# Patient Record
Sex: MALE | Race: BLACK OR AFRICAN AMERICAN | Employment: OTHER | URBAN - NONMETROPOLITAN AREA
[De-identification: names, ages, dates, MRNs, and addresses within clinical notes are randomized per-mention and may not be internally consistent; named-entity substitution may affect disease eponyms.]

---

## 2017-02-06 DIAGNOSIS — M10.9 ACUTE GOUT, UNSPECIFIED CAUSE, UNSPECIFIED SITE: ICD-10-CM

## 2017-02-06 DIAGNOSIS — I42.9 CARDIOMYOPATHY (HCC): ICD-10-CM

## 2017-02-06 DIAGNOSIS — I10 ESSENTIAL HYPERTENSION: ICD-10-CM

## 2017-02-06 RX ORDER — POTASSIUM CHLORIDE 20 MEQ/1
TABLET, EXTENDED RELEASE ORAL
Qty: 28 TABLET | Refills: 5 | Status: SHIPPED | OUTPATIENT
Start: 2017-02-06 | End: 2017-07-24 | Stop reason: SDUPTHER

## 2017-02-06 RX ORDER — ALLOPURINOL 100 MG/1
TABLET ORAL
Qty: 28 TABLET | Refills: 5 | Status: SHIPPED | OUTPATIENT
Start: 2017-02-06 | End: 2017-07-24 | Stop reason: SDUPTHER

## 2017-02-06 RX ORDER — CARVEDILOL 25 MG/1
TABLET ORAL
Qty: 56 TABLET | Refills: 5 | Status: SHIPPED | OUTPATIENT
Start: 2017-02-06 | End: 2017-07-24 | Stop reason: SDUPTHER

## 2017-02-24 ENCOUNTER — OFFICE VISIT (OUTPATIENT)
Dept: INTERNAL MEDICINE | Age: 52
End: 2017-02-24

## 2017-02-24 VITALS
HEART RATE: 78 BPM | OXYGEN SATURATION: 97 % | BODY MASS INDEX: 37.27 KG/M2 | HEIGHT: 74 IN | SYSTOLIC BLOOD PRESSURE: 138 MMHG | WEIGHT: 290.4 LBS | DIASTOLIC BLOOD PRESSURE: 90 MMHG

## 2017-02-24 DIAGNOSIS — Z72.0 TOBACCO ABUSE: ICD-10-CM

## 2017-02-24 DIAGNOSIS — J44.1 CHRONIC OBSTRUCTIVE PULMONARY DISEASE WITH ACUTE EXACERBATION (HCC): ICD-10-CM

## 2017-02-24 DIAGNOSIS — I42.9 CARDIOMYOPATHY (HCC): Primary | ICD-10-CM

## 2017-02-24 DIAGNOSIS — I10 ESSENTIAL HYPERTENSION: ICD-10-CM

## 2017-02-24 DIAGNOSIS — Z87.39 HISTORY OF GOUT: ICD-10-CM

## 2017-02-24 DIAGNOSIS — F20.0 PARANOID SCHIZOPHRENIA (HCC): ICD-10-CM

## 2017-02-24 DIAGNOSIS — G47.33 OBSTRUCTIVE SLEEP APNEA: ICD-10-CM

## 2017-02-24 PROCEDURE — 93000 ELECTROCARDIOGRAM COMPLETE: CPT | Performed by: INTERNAL MEDICINE

## 2017-02-24 PROCEDURE — 99214 OFFICE O/P EST MOD 30 MIN: CPT | Performed by: INTERNAL MEDICINE

## 2017-02-24 RX ORDER — MELOXICAM 7.5 MG/1
TABLET ORAL
Qty: 28 TABLET | Refills: 5 | Status: SHIPPED | OUTPATIENT
Start: 2017-02-24 | End: 2017-08-11 | Stop reason: SDUPTHER

## 2017-02-24 RX ORDER — ALBUTEROL SULFATE 90 UG/1
2 AEROSOL, METERED RESPIRATORY (INHALATION) EVERY 6 HOURS PRN
Qty: 1 INHALER | Refills: 5 | Status: SHIPPED | OUTPATIENT
Start: 2017-02-24 | End: 2017-08-11 | Stop reason: SDUPTHER

## 2017-02-24 RX ORDER — HYDROCHLOROTHIAZIDE 25 MG/1
25 TABLET ORAL DAILY
Qty: 30 TABLET | Refills: 5 | Status: SHIPPED | OUTPATIENT
Start: 2017-02-24 | End: 2017-08-11 | Stop reason: SDUPTHER

## 2017-03-03 DIAGNOSIS — K59.00 CONSTIPATION, UNSPECIFIED CONSTIPATION TYPE: ICD-10-CM

## 2017-03-06 RX ORDER — DOCUSATE SODIUM 100 MG/1
CAPSULE ORAL
Qty: 30 CAPSULE | Refills: 5 | Status: SHIPPED | OUTPATIENT
Start: 2017-03-06 | End: 2017-08-18 | Stop reason: SDUPTHER

## 2017-07-24 DIAGNOSIS — M10.9 ACUTE GOUT, UNSPECIFIED CAUSE, UNSPECIFIED SITE: ICD-10-CM

## 2017-07-24 DIAGNOSIS — I42.9 CARDIOMYOPATHY (HCC): ICD-10-CM

## 2017-07-24 DIAGNOSIS — I10 ESSENTIAL HYPERTENSION: ICD-10-CM

## 2017-07-25 RX ORDER — POTASSIUM CHLORIDE 20 MEQ/1
TABLET, EXTENDED RELEASE ORAL
Qty: 28 TABLET | Refills: 5 | Status: SHIPPED | OUTPATIENT
Start: 2017-07-25 | End: 2018-01-09 | Stop reason: SDUPTHER

## 2017-07-25 RX ORDER — ALLOPURINOL 100 MG/1
TABLET ORAL
Qty: 28 TABLET | Refills: 5 | Status: SHIPPED | OUTPATIENT
Start: 2017-07-25 | End: 2018-01-09 | Stop reason: SDUPTHER

## 2017-07-25 RX ORDER — CARVEDILOL 25 MG/1
TABLET ORAL
Qty: 56 TABLET | Refills: 5 | Status: SHIPPED | OUTPATIENT
Start: 2017-07-25 | End: 2018-01-09 | Stop reason: SDUPTHER

## 2017-08-11 ENCOUNTER — OFFICE VISIT (OUTPATIENT)
Dept: INTERNAL MEDICINE CLINIC | Age: 52
End: 2017-08-11
Payer: COMMERCIAL

## 2017-08-11 VITALS
HEIGHT: 74 IN | BODY MASS INDEX: 37.4 KG/M2 | OXYGEN SATURATION: 98 % | DIASTOLIC BLOOD PRESSURE: 88 MMHG | SYSTOLIC BLOOD PRESSURE: 126 MMHG | HEART RATE: 76 BPM | WEIGHT: 291.4 LBS

## 2017-08-11 DIAGNOSIS — Z87.39 H/O: GOUT: ICD-10-CM

## 2017-08-11 DIAGNOSIS — E66.9 OBESITY (BMI 30-39.9): ICD-10-CM

## 2017-08-11 DIAGNOSIS — I10 ESSENTIAL HYPERTENSION: ICD-10-CM

## 2017-08-11 DIAGNOSIS — Z87.39 HISTORY OF GOUT: ICD-10-CM

## 2017-08-11 DIAGNOSIS — I25.10 CORONARY ARTERY DISEASE INVOLVING NATIVE CORONARY ARTERY OF NATIVE HEART WITHOUT ANGINA PECTORIS: ICD-10-CM

## 2017-08-11 DIAGNOSIS — F17.200 TOBACCO USE DISORDER: ICD-10-CM

## 2017-08-11 DIAGNOSIS — M10.9 ACUTE GOUT, UNSPECIFIED CAUSE, UNSPECIFIED SITE: ICD-10-CM

## 2017-08-11 DIAGNOSIS — J44.1 CHRONIC OBSTRUCTIVE PULMONARY DISEASE WITH ACUTE EXACERBATION (HCC): Primary | ICD-10-CM

## 2017-08-11 DIAGNOSIS — F20.0 PARANOID SCHIZOPHRENIA (HCC): ICD-10-CM

## 2017-08-11 PROCEDURE — 99214 OFFICE O/P EST MOD 30 MIN: CPT | Performed by: INTERNAL MEDICINE

## 2017-08-11 PROCEDURE — 93000 ELECTROCARDIOGRAM COMPLETE: CPT | Performed by: INTERNAL MEDICINE

## 2017-08-11 RX ORDER — HYDROCHLOROTHIAZIDE 25 MG/1
25 TABLET ORAL DAILY
Qty: 30 TABLET | Refills: 5 | Status: SHIPPED | OUTPATIENT
Start: 2017-08-11 | End: 2018-01-17 | Stop reason: SDUPTHER

## 2017-08-11 RX ORDER — ALBUTEROL SULFATE 90 UG/1
2 AEROSOL, METERED RESPIRATORY (INHALATION) EVERY 6 HOURS PRN
Qty: 1 INHALER | Refills: 5 | Status: SHIPPED | OUTPATIENT
Start: 2017-08-11 | End: 2018-01-17 | Stop reason: SDUPTHER

## 2017-08-11 RX ORDER — MELOXICAM 7.5 MG/1
TABLET ORAL
Qty: 28 TABLET | Refills: 5 | Status: SHIPPED | OUTPATIENT
Start: 2017-08-11 | End: 2018-01-17 | Stop reason: SDUPTHER

## 2017-08-11 ASSESSMENT — PATIENT HEALTH QUESTIONNAIRE - PHQ9
2. FEELING DOWN, DEPRESSED OR HOPELESS: 0
1. LITTLE INTEREST OR PLEASURE IN DOING THINGS: 0
SUM OF ALL RESPONSES TO PHQ QUESTIONS 1-9: 0
SUM OF ALL RESPONSES TO PHQ9 QUESTIONS 1 & 2: 0

## 2017-08-15 ENCOUNTER — HOSPITAL ENCOUNTER (OUTPATIENT)
Dept: PULMONOLOGY | Age: 52
Discharge: HOME | End: 2017-08-15
Payer: COMMERCIAL

## 2017-08-15 DIAGNOSIS — J44.1 CHRONIC OBSTRUCTIVE PULMONARY DISEASE WITH ACUTE EXACERBATION (HCC): ICD-10-CM

## 2017-08-15 PROCEDURE — 94729 DIFFUSING CAPACITY: CPT

## 2017-08-15 PROCEDURE — 94060 EVALUATION OF WHEEZING: CPT

## 2017-08-15 PROCEDURE — 94726 PLETHYSMOGRAPHY LUNG VOLUMES: CPT

## 2017-08-17 DIAGNOSIS — Z87.39 H/O: GOUT: ICD-10-CM

## 2017-08-18 DIAGNOSIS — K59.00 CONSTIPATION, UNSPECIFIED CONSTIPATION TYPE: ICD-10-CM

## 2017-08-21 RX ORDER — DOCUSATE SODIUM 100 MG/1
CAPSULE ORAL
Qty: 30 CAPSULE | Refills: 5 | Status: SHIPPED | OUTPATIENT
Start: 2017-08-21 | End: 2018-01-17 | Stop reason: SDUPTHER

## 2017-09-13 ENCOUNTER — TELEPHONE (OUTPATIENT)
Dept: INTERNAL MEDICINE CLINIC | Age: 52
End: 2017-09-13

## 2017-09-15 RX ORDER — VARENICLINE TARTRATE 25 MG
KIT ORAL
Qty: 53 EACH | Refills: 0 | Status: SHIPPED | OUTPATIENT
Start: 2017-09-15 | End: 2017-11-02 | Stop reason: SDUPTHER

## 2017-09-24 ENCOUNTER — APPOINTMENT (OUTPATIENT)
Dept: GENERAL RADIOLOGY | Age: 52
End: 2017-09-24
Payer: COMMERCIAL

## 2017-09-24 ENCOUNTER — HOSPITAL ENCOUNTER (EMERGENCY)
Age: 52
Discharge: HOME | End: 2017-09-24
Payer: COMMERCIAL

## 2017-09-24 ENCOUNTER — APPOINTMENT (OUTPATIENT)
Dept: CT IMAGING | Age: 52
End: 2017-09-24
Payer: COMMERCIAL

## 2017-09-24 VITALS
OXYGEN SATURATION: 98 % | SYSTOLIC BLOOD PRESSURE: 163 MMHG | WEIGHT: 293 LBS | BODY MASS INDEX: 37.6 KG/M2 | HEART RATE: 79 BPM | HEIGHT: 74 IN | TEMPERATURE: 98.5 F | RESPIRATION RATE: 18 BRPM | DIASTOLIC BLOOD PRESSURE: 96 MMHG

## 2017-09-24 DIAGNOSIS — S39.012A LOW BACK STRAIN, INITIAL ENCOUNTER: Primary | ICD-10-CM

## 2017-09-24 LAB
BACTERIA: ABNORMAL /HPF
BILIRUBIN URINE: NEGATIVE
BLOOD, URINE: ABNORMAL
CASTS 2: ABNORMAL /LPF
CASTS UA: ABNORMAL /LPF
CHARACTER, URINE: CLEAR
COLOR: ABNORMAL
CRYSTALS, UA: ABNORMAL
EPITHELIAL CELLS, UA: ABNORMAL /HPF
GLUCOSE URINE: NEGATIVE MG/DL
KETONES, URINE: ABNORMAL
LEUKOCYTE ESTERASE, URINE: NEGATIVE
MISCELLANEOUS 2: ABNORMAL
NITRITE, URINE: NEGATIVE
PH UA: 5.5
PROTEIN UA: NEGATIVE
RBC URINE: ABNORMAL /HPF
RENAL EPITHELIAL, UA: ABNORMAL
SPECIFIC GRAVITY, URINE: 1.03 (ref 1–1.03)
UROBILINOGEN, URINE: 1 EU/DL
WBC UA: ABNORMAL /HPF
YEAST: ABNORMAL

## 2017-09-24 PROCEDURE — 74150 CT ABDOMEN W/O CONTRAST: CPT

## 2017-09-24 PROCEDURE — 96372 THER/PROPH/DIAG INJ SC/IM: CPT

## 2017-09-24 PROCEDURE — 76376 3D RENDER W/INTRP POSTPROCES: CPT

## 2017-09-24 PROCEDURE — 99284 EMERGENCY DEPT VISIT MOD MDM: CPT

## 2017-09-24 PROCEDURE — 6370000000 HC RX 637 (ALT 250 FOR IP): Performed by: PHYSICIAN ASSISTANT

## 2017-09-24 PROCEDURE — 72072 X-RAY EXAM THORAC SPINE 3VWS: CPT

## 2017-09-24 PROCEDURE — 81001 URINALYSIS AUTO W/SCOPE: CPT

## 2017-09-24 PROCEDURE — 72100 X-RAY EXAM L-S SPINE 2/3 VWS: CPT

## 2017-09-24 PROCEDURE — 6360000002 HC RX W HCPCS: Performed by: PHYSICIAN ASSISTANT

## 2017-09-24 RX ORDER — LIDOCAINE 50 MG/G
1 PATCH TOPICAL ONCE
Status: DISCONTINUED | OUTPATIENT
Start: 2017-09-24 | End: 2017-09-24 | Stop reason: HOSPADM

## 2017-09-24 RX ORDER — ORPHENADRINE CITRATE 30 MG/ML
60 INJECTION INTRAMUSCULAR; INTRAVENOUS ONCE
Status: COMPLETED | OUTPATIENT
Start: 2017-09-24 | End: 2017-09-24

## 2017-09-24 RX ORDER — LIDOCAINE 50 MG/G
1 PATCH TOPICAL DAILY
Qty: 15 PATCH | Refills: 0 | Status: SHIPPED | OUTPATIENT
Start: 2017-09-24 | End: 2018-01-17 | Stop reason: ALTCHOICE

## 2017-09-24 RX ORDER — KETOROLAC TROMETHAMINE 30 MG/ML
60 INJECTION, SOLUTION INTRAMUSCULAR; INTRAVENOUS ONCE
Status: COMPLETED | OUTPATIENT
Start: 2017-09-24 | End: 2017-09-24

## 2017-09-24 RX ORDER — CYCLOBENZAPRINE HCL 10 MG
10 TABLET ORAL 3 TIMES DAILY PRN
Qty: 15 TABLET | Refills: 0 | Status: SHIPPED | OUTPATIENT
Start: 2017-09-24 | End: 2017-10-04

## 2017-09-24 RX ADMIN — KETOROLAC TROMETHAMINE 60 MG: 30 INJECTION, SOLUTION INTRAMUSCULAR at 11:45

## 2017-09-24 RX ADMIN — ORPHENADRINE CITRATE 60 MG: 30 INJECTION INTRAMUSCULAR; INTRAVENOUS at 11:44

## 2017-09-24 ASSESSMENT — ENCOUNTER SYMPTOMS
EYE DISCHARGE: 0
SORE THROAT: 0
VOMITING: 0
SHORTNESS OF BREATH: 0
COUGH: 0
RHINORRHEA: 0
EYE REDNESS: 0
DIARRHEA: 0
NAUSEA: 0
WHEEZING: 0
ABDOMINAL PAIN: 0
BACK PAIN: 1

## 2017-09-24 ASSESSMENT — PAIN DESCRIPTION - DESCRIPTORS: DESCRIPTORS: CONSTANT

## 2017-09-24 ASSESSMENT — PAIN SCALES - GENERAL
PAINLEVEL_OUTOF10: 8
PAINLEVEL_OUTOF10: 6
PAINLEVEL_OUTOF10: 8

## 2017-09-24 ASSESSMENT — PAIN DESCRIPTION - ORIENTATION: ORIENTATION: LEFT;LOWER

## 2017-09-24 ASSESSMENT — PAIN DESCRIPTION - PAIN TYPE: TYPE: ACUTE PAIN

## 2017-09-24 ASSESSMENT — PAIN DESCRIPTION - LOCATION: LOCATION: BACK

## 2017-11-02 RX ORDER — VARENICLINE TARTRATE 25 MG
KIT ORAL
Qty: 53 EACH | Refills: 0 | Status: SHIPPED | OUTPATIENT
Start: 2017-11-02 | End: 2018-08-17 | Stop reason: SDUPTHER

## 2018-01-09 DIAGNOSIS — I10 ESSENTIAL HYPERTENSION: ICD-10-CM

## 2018-01-09 DIAGNOSIS — M10.9 ACUTE GOUT, UNSPECIFIED CAUSE, UNSPECIFIED SITE: ICD-10-CM

## 2018-01-09 DIAGNOSIS — I42.9 CARDIOMYOPATHY (HCC): ICD-10-CM

## 2018-01-10 RX ORDER — CARVEDILOL 25 MG/1
TABLET ORAL
Qty: 56 TABLET | Refills: 5 | Status: SHIPPED | OUTPATIENT
Start: 2018-01-10 | End: 2018-06-25 | Stop reason: SDUPTHER

## 2018-01-10 RX ORDER — ALLOPURINOL 100 MG/1
TABLET ORAL
Qty: 28 TABLET | Refills: 5 | Status: SHIPPED | OUTPATIENT
Start: 2018-01-10 | End: 2018-06-25 | Stop reason: SDUPTHER

## 2018-01-10 RX ORDER — POTASSIUM CHLORIDE 20 MEQ/1
TABLET, EXTENDED RELEASE ORAL
Qty: 28 TABLET | Refills: 5 | Status: SHIPPED | OUTPATIENT
Start: 2018-01-10 | End: 2018-06-25 | Stop reason: SDUPTHER

## 2018-01-17 ENCOUNTER — OFFICE VISIT (OUTPATIENT)
Dept: INTERNAL MEDICINE CLINIC | Age: 53
End: 2018-01-17
Payer: COMMERCIAL

## 2018-01-17 VITALS
WEIGHT: 298 LBS | HEART RATE: 72 BPM | DIASTOLIC BLOOD PRESSURE: 94 MMHG | SYSTOLIC BLOOD PRESSURE: 162 MMHG | HEIGHT: 74 IN | BODY MASS INDEX: 38.24 KG/M2

## 2018-01-17 DIAGNOSIS — G47.33 OBSTRUCTIVE SLEEP APNEA: ICD-10-CM

## 2018-01-17 DIAGNOSIS — I10 ESSENTIAL HYPERTENSION: ICD-10-CM

## 2018-01-17 DIAGNOSIS — J44.9 CHRONIC OBSTRUCTIVE PULMONARY DISEASE, UNSPECIFIED COPD TYPE (HCC): ICD-10-CM

## 2018-01-17 DIAGNOSIS — F20.0 PARANOID SCHIZOPHRENIA (HCC): ICD-10-CM

## 2018-01-17 DIAGNOSIS — Z87.39 HISTORY OF GOUT: ICD-10-CM

## 2018-01-17 DIAGNOSIS — E66.9 OBESITY (BMI 30-39.9): ICD-10-CM

## 2018-01-17 DIAGNOSIS — K59.00 CONSTIPATION, UNSPECIFIED CONSTIPATION TYPE: ICD-10-CM

## 2018-01-17 DIAGNOSIS — I42.9 CARDIOMYOPATHY, UNSPECIFIED TYPE (HCC): Primary | ICD-10-CM

## 2018-01-17 DIAGNOSIS — Z72.0 TOBACCO ABUSE: ICD-10-CM

## 2018-01-17 PROCEDURE — 3023F SPIROM DOC REV: CPT | Performed by: INTERNAL MEDICINE

## 2018-01-17 PROCEDURE — 3017F COLORECTAL CA SCREEN DOC REV: CPT | Performed by: INTERNAL MEDICINE

## 2018-01-17 PROCEDURE — G8417 CALC BMI ABV UP PARAM F/U: HCPCS | Performed by: INTERNAL MEDICINE

## 2018-01-17 PROCEDURE — 4004F PT TOBACCO SCREEN RCVD TLK: CPT | Performed by: INTERNAL MEDICINE

## 2018-01-17 PROCEDURE — 93000 ELECTROCARDIOGRAM COMPLETE: CPT | Performed by: INTERNAL MEDICINE

## 2018-01-17 PROCEDURE — 99214 OFFICE O/P EST MOD 30 MIN: CPT | Performed by: INTERNAL MEDICINE

## 2018-01-17 PROCEDURE — G8484 FLU IMMUNIZE NO ADMIN: HCPCS | Performed by: INTERNAL MEDICINE

## 2018-01-17 PROCEDURE — G8598 ASA/ANTIPLAT THER USED: HCPCS | Performed by: INTERNAL MEDICINE

## 2018-01-17 PROCEDURE — G8427 DOCREV CUR MEDS BY ELIG CLIN: HCPCS | Performed by: INTERNAL MEDICINE

## 2018-01-17 PROCEDURE — G8926 SPIRO NO PERF OR DOC: HCPCS | Performed by: INTERNAL MEDICINE

## 2018-01-17 RX ORDER — MELOXICAM 7.5 MG/1
TABLET ORAL
Qty: 28 TABLET | Refills: 5 | Status: SHIPPED | OUTPATIENT
Start: 2018-01-17 | End: 2018-07-18 | Stop reason: SDUPTHER

## 2018-01-17 RX ORDER — HYDROCHLOROTHIAZIDE 25 MG/1
25 TABLET ORAL DAILY
Qty: 30 TABLET | Refills: 5 | Status: SHIPPED | OUTPATIENT
Start: 2018-01-17 | End: 2018-07-18 | Stop reason: SDUPTHER

## 2018-01-17 RX ORDER — DOCUSATE SODIUM 100 MG/1
CAPSULE, LIQUID FILLED ORAL
Qty: 30 CAPSULE | Refills: 5 | Status: SHIPPED | OUTPATIENT
Start: 2018-01-17 | End: 2018-07-18 | Stop reason: SDUPTHER

## 2018-01-17 RX ORDER — LOSARTAN POTASSIUM 50 MG/1
100 TABLET ORAL DAILY
COMMUNITY
Start: 2018-01-09 | End: 2020-10-07

## 2018-01-17 RX ORDER — ALBUTEROL SULFATE 90 UG/1
2 AEROSOL, METERED RESPIRATORY (INHALATION) EVERY 6 HOURS PRN
Qty: 1 INHALER | Refills: 5 | Status: SHIPPED | OUTPATIENT
Start: 2018-01-17 | End: 2018-07-18 | Stop reason: SDUPTHER

## 2018-01-17 RX ORDER — VARENICLINE TARTRATE 1 MG/1
1 TABLET, FILM COATED ORAL 2 TIMES DAILY
Qty: 60 TABLET | Refills: 3 | Status: SHIPPED | OUTPATIENT
Start: 2018-01-17 | End: 2018-04-27 | Stop reason: SDUPTHER

## 2018-01-17 NOTE — PROGRESS NOTES
Chief Complaint   Patient presents with    Cardiomyopathy    Hypertension    COPD       Patient presents for medical evaluation. I last saw the patient 6 months ago. Patient is followed on a regular basis by Dr. Charles Burgess  Patient has not had any admissions to the hospital or change in medications since the last visit here. Pertinent past history reviewed and summarized below     Pertinent past history reviewed and summarized below  . He was  hospitalized in the psych fischer at [facility] for 34 days  last year  Apparently he was threatening family members, he denies this  [facility]  Patient has h/o cardiomyopathy, sleep apnea for which he has never been treated   He says he has never had sleep studies(he has canceled polysomnography and overnight pulse oximetry testing)      He says he has been feeling well. No major problems  He has had no ER visits. He has not had any gout attacks. He denies shortness of breath, weight gain, orthopnea  Patient says Dr. Zbigniew Mccollum has been adjusting his blood pressure medications. He said it is been running high. He said he had quit smoking but his father  in October. He stopped his Chantix and started smoking again  Patient Active Problem List   Diagnosis    Hypertension    Depression    Chest pain    Left ventricular dysfunction    Cardiomyopathy ([address]    Schizoaffective disorder (HCC)    Chronic bronchitis ([address]    Obstructive sleep apnea    Obesity (BMI 30-39. 9)    Sleep difficulties    Snoring    Bruxism    Sleep talking    Restless sleeper    Insomnia    Sleep disturbance    Claustrophobia    Irregular heart beat    Heart disease    COPD exacerbation (HCC)    COPD exacerbation (HCC)    Influenza A    CAD (coronary artery disease)    Acute respiratory failure with hypoxia and hypercapnia (HCC)    Tobacco abuse    Influenza with respiratory manifestation other than pneumonia    capsule 5    [DISCONTINUED] albuterol sulfate HFA (PROVENTIL HFA) 108 (90 Base) MCG/ACT inhaler Inhale 2 puffs into the lungs every 6 hours as needed for Wheezing or Shortness of Breath 1 Inhaler 5     No facility-administered encounter medications on file as of 1/17/2018. DIAGNOSES  1. Cardiomyopathy, unspecified type ([facility]     2. Essential hypertension  EKG 12 Lead    hydrochlorothiazide (HYDRODIURIL) 25 MG tablet   3. Constipation, unspecified constipation type  docusate sodium (DOCQLACE) 100 MG capsule   4. History of gout  meloxicam (MOBIC) 7.5 MG tablet   5. Chronic obstructive pulmonary disease, unspecified COPD type ([facility])  fluticasone-salmeterol (ADVAIR) 100-50 MCG/DOSE diskus inhaler    tiotropium (SPIRIVA HANDIHALER) 18 MCG inhalation capsule    albuterol sulfate HFA (PROVENTIL HFA) 108 (90 Base) MCG/ACT inhaler   6. Obesity (BMI 30-39.9)     7. Obstructive sleep apnea     8. Paranoid schizophrenia ([facility]     9. Tobacco abuse  varenicline (CHANTIX CONTINUING MONTH GARETT) 1 MG tablet   EKG shows Sinus  Rhythm   Nonspecific QRS widening     Patient has a diagnosis of COPD, continues to smoke   Discussed smoking and many of the possible adverse side effects including COPD, MI, stroke, lung, esophageal, head and neck and bladder cancer as well as recurrent URIs. Modalities to help quitting were discussed as well, including nicotine replacement, wellbutrin and varenicline. Rewards of quitting discussed as well  He said he would like to try Chantix again. I will prescribe this  Cardiac status stable, patient doing extremely well without any symptoms. I reviewed patient's medications and possible interactions and side effects. I refilled those that were needed.   Reviewed all recent labs, will follow up labs ordered and notify patient of any changes in management  BMI 38.24  Low salt, heart healthy diet, exercise and weight loss recommended  Blood pressure elevated, PCP managing      PFTs last visit showed mild obstructive disease    I will see the patient back in 6 months. Continue medications at current doses.

## 2018-04-27 DIAGNOSIS — Z72.0 TOBACCO ABUSE: ICD-10-CM

## 2018-04-30 RX ORDER — VARENICLINE TARTRATE 1 MG/1
1 TABLET, FILM COATED ORAL 2 TIMES DAILY
Qty: 56 TABLET | Refills: 3 | Status: SHIPPED | OUTPATIENT
Start: 2018-04-30 | End: 2018-07-18

## 2018-06-25 DIAGNOSIS — I10 ESSENTIAL HYPERTENSION: ICD-10-CM

## 2018-06-25 DIAGNOSIS — M10.9 ACUTE GOUT, UNSPECIFIED CAUSE, UNSPECIFIED SITE: ICD-10-CM

## 2018-06-25 DIAGNOSIS — I42.9 CARDIOMYOPATHY (HCC): ICD-10-CM

## 2018-06-25 RX ORDER — ALLOPURINOL 100 MG/1
TABLET ORAL
Qty: 28 TABLET | Refills: 5 | Status: SHIPPED | OUTPATIENT
Start: 2018-06-25 | End: 2018-12-06 | Stop reason: SDUPTHER

## 2018-06-25 RX ORDER — POTASSIUM CHLORIDE 20 MEQ/1
TABLET, EXTENDED RELEASE ORAL
Qty: 28 TABLET | Refills: 5 | Status: SHIPPED | OUTPATIENT
Start: 2018-06-25 | End: 2018-12-06 | Stop reason: SDUPTHER

## 2018-06-25 RX ORDER — CARVEDILOL 25 MG/1
TABLET ORAL
Qty: 56 TABLET | Refills: 5 | Status: SHIPPED | OUTPATIENT
Start: 2018-06-25 | End: 2018-12-06 | Stop reason: SDUPTHER

## 2018-07-18 ENCOUNTER — OFFICE VISIT (OUTPATIENT)
Dept: INTERNAL MEDICINE CLINIC | Age: 53
End: 2018-07-18
Payer: COMMERCIAL

## 2018-07-18 VITALS
SYSTOLIC BLOOD PRESSURE: 136 MMHG | HEART RATE: 74 BPM | WEIGHT: 299 LBS | HEIGHT: 74 IN | DIASTOLIC BLOOD PRESSURE: 70 MMHG | BODY MASS INDEX: 38.37 KG/M2

## 2018-07-18 DIAGNOSIS — K59.00 CONSTIPATION, UNSPECIFIED CONSTIPATION TYPE: ICD-10-CM

## 2018-07-18 DIAGNOSIS — I42.9 CARDIOMYOPATHY, UNSPECIFIED TYPE (HCC): Primary | ICD-10-CM

## 2018-07-18 DIAGNOSIS — E66.9 OBESITY (BMI 30-39.9): ICD-10-CM

## 2018-07-18 DIAGNOSIS — I10 ESSENTIAL HYPERTENSION: ICD-10-CM

## 2018-07-18 DIAGNOSIS — J44.9 CHRONIC OBSTRUCTIVE PULMONARY DISEASE, UNSPECIFIED COPD TYPE (HCC): ICD-10-CM

## 2018-07-18 DIAGNOSIS — F25.9 SCHIZOAFFECTIVE DISORDER, UNSPECIFIED TYPE (HCC): ICD-10-CM

## 2018-07-18 DIAGNOSIS — Z87.39 HISTORY OF GOUT: ICD-10-CM

## 2018-07-18 DIAGNOSIS — Z72.0 TOBACCO ABUSE: ICD-10-CM

## 2018-07-18 PROCEDURE — 3017F COLORECTAL CA SCREEN DOC REV: CPT | Performed by: INTERNAL MEDICINE

## 2018-07-18 PROCEDURE — G8427 DOCREV CUR MEDS BY ELIG CLIN: HCPCS | Performed by: INTERNAL MEDICINE

## 2018-07-18 PROCEDURE — 4004F PT TOBACCO SCREEN RCVD TLK: CPT | Performed by: INTERNAL MEDICINE

## 2018-07-18 PROCEDURE — G8417 CALC BMI ABV UP PARAM F/U: HCPCS | Performed by: INTERNAL MEDICINE

## 2018-07-18 PROCEDURE — 3023F SPIROM DOC REV: CPT | Performed by: INTERNAL MEDICINE

## 2018-07-18 PROCEDURE — 99214 OFFICE O/P EST MOD 30 MIN: CPT | Performed by: INTERNAL MEDICINE

## 2018-07-18 PROCEDURE — G8598 ASA/ANTIPLAT THER USED: HCPCS | Performed by: INTERNAL MEDICINE

## 2018-07-18 PROCEDURE — G8926 SPIRO NO PERF OR DOC: HCPCS | Performed by: INTERNAL MEDICINE

## 2018-07-18 PROCEDURE — 93000 ELECTROCARDIOGRAM COMPLETE: CPT | Performed by: INTERNAL MEDICINE

## 2018-07-18 RX ORDER — ALBUTEROL SULFATE 90 UG/1
2 AEROSOL, METERED RESPIRATORY (INHALATION) EVERY 6 HOURS PRN
Qty: 1 INHALER | Refills: 5 | Status: SHIPPED | OUTPATIENT
Start: 2018-07-18 | End: 2019-01-02 | Stop reason: SDUPTHER

## 2018-07-18 RX ORDER — DOCUSATE SODIUM 100 MG/1
CAPSULE, LIQUID FILLED ORAL
Qty: 30 CAPSULE | Refills: 5 | Status: SHIPPED | OUTPATIENT
Start: 2018-07-18 | End: 2019-01-04 | Stop reason: SDUPTHER

## 2018-07-18 RX ORDER — MELOXICAM 7.5 MG/1
TABLET ORAL
Qty: 28 TABLET | Refills: 5 | Status: SHIPPED | OUTPATIENT
Start: 2018-07-18 | End: 2019-01-02 | Stop reason: SDUPTHER

## 2018-07-18 RX ORDER — HYDROCHLOROTHIAZIDE 25 MG/1
25 TABLET ORAL DAILY
Qty: 30 TABLET | Refills: 5 | Status: SHIPPED | OUTPATIENT
Start: 2018-07-18 | End: 2019-01-02 | Stop reason: SDUPTHER

## 2018-07-18 NOTE — PROGRESS NOTES
Chief Complaint   Patient presents with    Cardiomyopathy    COPD    Hypertension       Patient presents for medical evaluation. I last saw the patient 6 months ago. Patient is followed on a regular basis by Dr. Crystal Lea  Patient has not had any admissions to the hospital or change in medications since the last visit here. Pertinent past history reviewed and summarized below     Pertinent past history reviewed and summarized below  . He was  hospitalized in the psych fischer at [facility] for 34 days  two years ago  Apparently he was threatening family members, he denies this  [facility]  Patient has h/o cardiomyopathy, sleep apnea for which he has never been treated   He says he has never had sleep studies(he has canceled polysomnography and overnight pulse oximetry testing)      He says he has been feeling well. No major problems  He has had no ER visits. He has not had any gout attacks. He denies shortness of breath, weight gain, orthopnea    He said he had quit smoking but his father  in October. He stopped his Chantix and started smoking again  Patient Active Problem List   Diagnosis    Hypertension    Depression    Chest pain    Left ventricular dysfunction    Cardiomyopathy ([address]    Schizoaffective disorder (HCC)    Chronic bronchitis ([address]    Obstructive sleep apnea    Obesity (BMI 30-39. 9)    Sleep difficulties    Snoring    Bruxism    Sleep talking    Restless sleeper    Insomnia    Sleep disturbance    Claustrophobia    Irregular heart beat    Heart disease    COPD exacerbation (HCC)    COPD exacerbation (HCC)    Influenza A    CAD (coronary artery disease)    Acute respiratory failure with hypoxia and hypercapnia (HCC)    Tobacco abuse    Influenza with respiratory manifestation other than pneumonia    Respiratory acidosis    NETTA (obstructive sleep apnea)    Hypersomnia    Coronary artery disease involving native coronary artery of native heart without angina pectoris    Essential hypertension       Current Outpatient Prescriptions   Medication Sig Dispense Refill    docusate sodium (DOCQLACE) 100 MG capsule TAKE 1 CAPSULE BY MOUTH DAILY. 30 capsule 5    meloxicam (MOBIC) 7.5 MG tablet TAKE 1 TABLET BY MOUTH DAILY 28 tablet 5    hydrochlorothiazide (HYDRODIURIL) 25 MG tablet Take 1 tablet by mouth daily 30 tablet 5    fluticasone-salmeterol (ADVAIR) 100-50 MCG/DOSE diskus inhaler Inhale 1 puff into the lungs every 12 hours 60 each 5    albuterol sulfate HFA (PROVENTIL HFA) 108 (90 Base) MCG/ACT inhaler Inhale 2 puffs into the lungs every 6 hours as needed for Wheezing or Shortness of Breath 1 Inhaler 5    allopurinol (ZYLOPRIM) 100 MG tablet TAKE 1 TABLET BY MOUTH DAILY 28 tablet 5    potassium chloride (KLOR-CON M) 20 MEQ extended release tablet TAKE 1 TABLET BY MOUTH DAILY. 28 tablet 5    carvedilol (COREG) 25 MG tablet TAKE 1 TABLET BY MOUTH 2 TIMES DAILY 56 tablet 5    losartan (COZAAR) 50 MG tablet Take 50 mg by mouth daily      guaiFENesin (MUCINEX) 600 MG SR tablet Take 1 tablet by mouth 2 times daily as needed for Congestion      divalproex (DEPAKOTE ER) 500 MG ER tablet Take 1,500 mg by mouth nightly      paliperidone palmitate (INVEGA SUSTENNA) 156 MG/ML SUSP IM injection Inject 156 mg into the muscle every 30 days.  aspirin 81 MG chewable tablet Take 81 mg by mouth daily.  tiotropium (SPIRIVA HANDIHALER) 18 MCG inhalation capsule Inhale 1 capsule into the lungs daily 30 capsule 5    varenicline (CHANTIX STARTING MONTH PAK) 0.5 MG X 11 & 1 MG X 42 tablet Use according to pkg instructions. 48 each 0     No current facility-administered medications for this visit. No Known Allergies    Review of Systems - General ROS: negative  Psychological ROS: negative  Hematological and Lymphatic ROS: No history of blood clots or bleeding disorder.    Respiratory ROS: no cough,

## 2018-07-18 NOTE — PATIENT INSTRUCTIONS
avoid while taking varenicline? Do not drink large amounts alcohol while taking this medicine. Varenicline can increase the effects of alcohol or change the way you react to it. Some people taking varenicline have had unusual or aggressive behavior or forgetfulness while drinking alcohol. Do not use other medicines to quit smoking, unless your doctor tells you to. Using varenicline while wearing a nicotine patch can cause unpleasant side effects. This medicine may impair your thinking or reactions. You may also have mood or behavior changes when you quit smoking. Until you know how varenicline and the smoking cessation process are going to affect you, be careful if you drive or do anything that requires you to be cautious and alert. What are the possible side effects of varenicline? Get emergency medical help if you have signs of an allergic reaction: hives; difficulty breathing; swelling of your face, lips, tongue, or throat. Stop using varenicline and call your doctor at once if you have:  · a seizure (convulsions);  · thoughts about suicide or hurting yourself;  · strange dreams, sleepwalking, trouble sleeping;  · new or worsening mental health problems --mood or behavior changes, depression, agitation, hostility, aggression;  · heart attack symptoms --chest pain or pressure, pain spreading to your jaw or shoulder, nausea, sweating;  · stroke symptoms --sudden numbness or weakness (especially on one side of the body), slurred speech, problems with vision or balance; or  · severe skin reaction --swelling or redness of the skin, blisters in your mouth, or skin rash that spreads and causes blistering and peeling. Your family or other caregivers should also be alert to changes in your mood or behavior. Common side effects may include:  · nausea (may persist for several months), vomiting;  · constipation, gas;  · sleep problems (insomnia); or  · unusual dreams.   This is not a complete list of side effects and others may occur. Call your doctor for medical advice about side effects. You may report side effects to FDA at 8-174-FDA-9539. What other drugs will affect varenicline? After you stop smoking, the doses of your other medications may need to be adjusted. Tell your doctor about all other medicines you use, especially:  · insulin;  · a blood thinner such as warfarin (Coumadin, Jantoven); or  · asthma medicine (theophylline and others). This list is not complete. Other drugs may interact with varenicline, including prescription and over-the-counter medicines, vitamins, and herbal products. Not all possible interactions are listed in this medication guide. Where can I get more information? Your pharmacist can provide more information about varenicline. Remember, keep this and all other medicines out of the reach of children, never share your medicines with others, and use this medication only for the indication prescribed. Every effort has been made to ensure that the information provided by [address] is accurate, up-to-date, and complete, but no guarantee is made to that effect. Drug information contained herein may be time sensitive. [employer] information has been compiled for use by healthcare practitioners and consumers in the [place] and therefore [employer] does not warrant that uses outside of the [place] are appropriate, unless specifically indicated otherwise. [facility]'s drug information does not endorse drugs, diagnose patients or recommend therapy. [facility] drug information is an informational resource designed to assist licensed healthcare practitioners in caring for their patients and/or to serve consumers viewing this service as a supplement to, and not a substitute for, the expertise, skill, knowledge and judgment of healthcare practitioners.  The absence of a warning for a given drug or drug combination in no way should be construed to indicate that the drug or drug combination is safe, effective or appropriate for any given patient. [facility] does not assume any responsibility for any aspect of healthcare administered with the aid of information [facility] provides. The information contained herein is not intended to cover all possible uses, directions, precautions, warnings, drug interactions, allergic reactions, or adverse effects. If you have questions about the drugs you are taking, check with your doctor, nurse or pharmacist.  Copyright 4148-3818 [address]. Version: 8.03. Revision date: 12/21/2016. Care instructions adapted under license by [facility] ([facility]). If you have questions about a medical condition or this instruction, always ask your healthcare professional. [place] [address] any warranty or liability for your use of this information.

## 2018-08-17 RX ORDER — VARENICLINE TARTRATE 25 MG
KIT ORAL
Qty: 53 EACH | Refills: 0 | Status: SHIPPED | OUTPATIENT
Start: 2018-08-17 | End: 2019-01-02

## 2018-08-17 NOTE — TELEPHONE ENCOUNTER
Mother called in asking for chantix script. She says he tried this before but started smoking again. Would like to try again.    They would like to get at [facility] on [address]

## 2018-12-06 DIAGNOSIS — I10 ESSENTIAL HYPERTENSION: ICD-10-CM

## 2018-12-06 DIAGNOSIS — I42.9 CARDIOMYOPATHY (HCC): ICD-10-CM

## 2018-12-06 DIAGNOSIS — M10.9 ACUTE GOUT, UNSPECIFIED CAUSE, UNSPECIFIED SITE: ICD-10-CM

## 2018-12-07 RX ORDER — ALLOPURINOL 100 MG/1
TABLET ORAL
Qty: 30 TABLET | Refills: 5 | Status: SHIPPED | OUTPATIENT
Start: 2018-12-07 | End: 2019-07-02 | Stop reason: DRUGHIGH

## 2018-12-07 RX ORDER — POTASSIUM CHLORIDE 20 MEQ/1
TABLET, EXTENDED RELEASE ORAL
Qty: 30 TABLET | Refills: 5 | Status: SHIPPED | OUTPATIENT
Start: 2018-12-07 | End: 2019-04-23 | Stop reason: SDUPTHER

## 2018-12-07 RX ORDER — CARVEDILOL 25 MG/1
TABLET ORAL
Qty: 60 TABLET | Refills: 5 | Status: SHIPPED | OUTPATIENT
Start: 2018-12-07 | End: 2019-04-23 | Stop reason: SDUPTHER

## 2019-01-02 ENCOUNTER — OFFICE VISIT (OUTPATIENT)
Dept: INTERNAL MEDICINE CLINIC | Age: 54
End: 2019-01-02
Payer: COMMERCIAL

## 2019-01-02 VITALS
HEART RATE: 74 BPM | WEIGHT: 305.6 LBS | HEIGHT: 74 IN | SYSTOLIC BLOOD PRESSURE: 136 MMHG | BODY MASS INDEX: 39.22 KG/M2 | DIASTOLIC BLOOD PRESSURE: 80 MMHG

## 2019-01-02 DIAGNOSIS — F20.0 PARANOID SCHIZOPHRENIA (HCC): ICD-10-CM

## 2019-01-02 DIAGNOSIS — J44.9 CHRONIC OBSTRUCTIVE PULMONARY DISEASE, UNSPECIFIED COPD TYPE (HCC): ICD-10-CM

## 2019-01-02 DIAGNOSIS — Z72.0 TOBACCO ABUSE: ICD-10-CM

## 2019-01-02 DIAGNOSIS — Z87.39 HISTORY OF GOUT: ICD-10-CM

## 2019-01-02 DIAGNOSIS — G47.33 OBSTRUCTIVE SLEEP APNEA: ICD-10-CM

## 2019-01-02 DIAGNOSIS — E66.9 OBESITY (BMI 30-39.9): ICD-10-CM

## 2019-01-02 DIAGNOSIS — I42.9 CARDIOMYOPATHY, UNSPECIFIED TYPE (HCC): Primary | ICD-10-CM

## 2019-01-02 DIAGNOSIS — I10 ESSENTIAL HYPERTENSION: ICD-10-CM

## 2019-01-02 PROCEDURE — G8427 DOCREV CUR MEDS BY ELIG CLIN: HCPCS | Performed by: INTERNAL MEDICINE

## 2019-01-02 PROCEDURE — 93000 ELECTROCARDIOGRAM COMPLETE: CPT | Performed by: INTERNAL MEDICINE

## 2019-01-02 PROCEDURE — 3023F SPIROM DOC REV: CPT | Performed by: INTERNAL MEDICINE

## 2019-01-02 PROCEDURE — 4004F PT TOBACCO SCREEN RCVD TLK: CPT | Performed by: INTERNAL MEDICINE

## 2019-01-02 PROCEDURE — G8484 FLU IMMUNIZE NO ADMIN: HCPCS | Performed by: INTERNAL MEDICINE

## 2019-01-02 PROCEDURE — G8926 SPIRO NO PERF OR DOC: HCPCS | Performed by: INTERNAL MEDICINE

## 2019-01-02 PROCEDURE — 3017F COLORECTAL CA SCREEN DOC REV: CPT | Performed by: INTERNAL MEDICINE

## 2019-01-02 PROCEDURE — 99214 OFFICE O/P EST MOD 30 MIN: CPT | Performed by: INTERNAL MEDICINE

## 2019-01-02 PROCEDURE — G8417 CALC BMI ABV UP PARAM F/U: HCPCS | Performed by: INTERNAL MEDICINE

## 2019-01-02 PROCEDURE — G8598 ASA/ANTIPLAT THER USED: HCPCS | Performed by: INTERNAL MEDICINE

## 2019-01-02 RX ORDER — ALBUTEROL SULFATE 90 UG/1
2 AEROSOL, METERED RESPIRATORY (INHALATION) EVERY 6 HOURS PRN
Qty: 1 INHALER | Refills: 5 | Status: SHIPPED | OUTPATIENT
Start: 2019-01-02 | End: 2019-12-20 | Stop reason: SDUPTHER

## 2019-01-02 RX ORDER — HYDROCHLOROTHIAZIDE 25 MG/1
25 TABLET ORAL DAILY
Qty: 30 TABLET | Refills: 5 | Status: SHIPPED | OUTPATIENT
Start: 2019-01-02 | End: 2019-06-21 | Stop reason: SDUPTHER

## 2019-01-02 RX ORDER — MELOXICAM 7.5 MG/1
TABLET ORAL
Qty: 28 TABLET | Refills: 5 | Status: SHIPPED | OUTPATIENT
Start: 2019-01-02 | End: 2019-06-21 | Stop reason: SDUPTHER

## 2019-01-02 ASSESSMENT — PATIENT HEALTH QUESTIONNAIRE - PHQ9
1. LITTLE INTEREST OR PLEASURE IN DOING THINGS: 0
SUM OF ALL RESPONSES TO PHQ QUESTIONS 1-9: 0
SUM OF ALL RESPONSES TO PHQ9 QUESTIONS 1 & 2: 0
2. FEELING DOWN, DEPRESSED OR HOPELESS: 0
SUM OF ALL RESPONSES TO PHQ QUESTIONS 1-9: 0

## 2019-01-04 DIAGNOSIS — K59.00 CONSTIPATION, UNSPECIFIED CONSTIPATION TYPE: ICD-10-CM

## 2019-01-04 RX ORDER — DOCUSATE SODIUM 100 MG/1
CAPSULE, LIQUID FILLED ORAL
Qty: 28 CAPSULE | Refills: 5 | Status: SHIPPED | OUTPATIENT
Start: 2019-01-04 | End: 2019-05-23 | Stop reason: SDUPTHER

## 2019-04-23 DIAGNOSIS — I10 ESSENTIAL HYPERTENSION: ICD-10-CM

## 2019-04-23 DIAGNOSIS — I42.9 CARDIOMYOPATHY (HCC): ICD-10-CM

## 2019-04-29 RX ORDER — CARVEDILOL 25 MG/1
TABLET ORAL
Qty: 56 TABLET | Refills: 5 | Status: SHIPPED | OUTPATIENT
Start: 2019-04-29 | End: 2019-10-11 | Stop reason: SDUPTHER

## 2019-04-29 RX ORDER — POTASSIUM CHLORIDE 20 MEQ/1
TABLET, EXTENDED RELEASE ORAL
Qty: 28 TABLET | Refills: 5 | Status: SHIPPED | OUTPATIENT
Start: 2019-04-29 | End: 2019-10-11 | Stop reason: SDUPTHER

## 2019-05-23 DIAGNOSIS — K59.00 CONSTIPATION, UNSPECIFIED CONSTIPATION TYPE: ICD-10-CM

## 2019-05-29 RX ORDER — DOCUSATE SODIUM 100 MG/1
CAPSULE, LIQUID FILLED ORAL
Qty: 28 CAPSULE | Refills: 5 | Status: SHIPPED | OUTPATIENT
Start: 2019-05-29 | End: 2019-11-06 | Stop reason: SDUPTHER

## 2019-06-21 DIAGNOSIS — I10 ESSENTIAL HYPERTENSION: ICD-10-CM

## 2019-06-21 DIAGNOSIS — Z87.39 HISTORY OF GOUT: ICD-10-CM

## 2019-06-28 RX ORDER — HYDROCHLOROTHIAZIDE 25 MG/1
25 TABLET ORAL DAILY
Qty: 28 TABLET | Refills: 5 | Status: SHIPPED | OUTPATIENT
Start: 2019-06-28 | End: 2019-12-06 | Stop reason: SDUPTHER

## 2019-06-28 RX ORDER — MELOXICAM 7.5 MG/1
TABLET ORAL
Qty: 28 TABLET | Refills: 5 | Status: ON HOLD | OUTPATIENT
Start: 2019-06-28 | End: 2019-11-18 | Stop reason: HOSPADM

## 2019-07-02 ENCOUNTER — OFFICE VISIT (OUTPATIENT)
Dept: INTERNAL MEDICINE CLINIC | Age: 54
End: 2019-07-02

## 2019-07-02 VITALS
HEIGHT: 74 IN | BODY MASS INDEX: 36.45 KG/M2 | SYSTOLIC BLOOD PRESSURE: 131 MMHG | RESPIRATION RATE: 16 BRPM | HEART RATE: 74 BPM | OXYGEN SATURATION: 97 % | DIASTOLIC BLOOD PRESSURE: 80 MMHG | WEIGHT: 284 LBS

## 2019-07-02 DIAGNOSIS — F20.0 PARANOID SCHIZOPHRENIA (HCC): ICD-10-CM

## 2019-07-02 DIAGNOSIS — G47.33 OBSTRUCTIVE SLEEP APNEA: ICD-10-CM

## 2019-07-02 DIAGNOSIS — I10 ESSENTIAL HYPERTENSION: ICD-10-CM

## 2019-07-02 DIAGNOSIS — E66.9 OBESITY (BMI 30-39.9): ICD-10-CM

## 2019-07-02 DIAGNOSIS — I42.9 CARDIOMYOPATHY, UNSPECIFIED TYPE (HCC): Primary | ICD-10-CM

## 2019-07-02 DIAGNOSIS — J44.9 CHRONIC OBSTRUCTIVE PULMONARY DISEASE, UNSPECIFIED COPD TYPE (HCC): ICD-10-CM

## 2019-07-02 PROCEDURE — 99214 OFFICE O/P EST MOD 30 MIN: CPT | Performed by: NURSE PRACTITIONER

## 2019-07-02 PROCEDURE — 93000 ELECTROCARDIOGRAM COMPLETE: CPT | Performed by: NURSE PRACTITIONER

## 2019-07-02 RX ORDER — ALLOPURINOL 300 MG/1
300 TABLET ORAL DAILY
COMMUNITY

## 2019-07-02 ASSESSMENT — ENCOUNTER SYMPTOMS
VOMITING: 0
CONSTIPATION: 0
WHEEZING: 0
SORE THROAT: 0
CHEST TIGHTNESS: 0
NAUSEA: 0
DIARRHEA: 0
BLOOD IN STOOL: 0
ABDOMINAL PAIN: 0
CHOKING: 0
COUGH: 0
SHORTNESS OF BREATH: 0

## 2019-07-02 ASSESSMENT — COPD QUESTIONNAIRES: COPD: 1

## 2019-07-02 NOTE — PROGRESS NOTES
screen  06/07/2023    DTaP/Tdap/Td vaccine (2 - Td) 06/03/2025       Subjective:      Review of Systems   Constitutional: Negative for activity change, chills, fatigue and fever. HENT: Negative for congestion and sore throat. Respiratory: Negative for cough, choking, chest tightness, shortness of breath and wheezing. Cardiovascular: Negative for chest pain, palpitations and leg swelling. Gastrointestinal: Negative for abdominal pain, blood in stool, constipation, diarrhea, nausea and vomiting. Genitourinary: Negative for dysuria, frequency, hematuria and urgency. Musculoskeletal: Negative for arthralgias and myalgias. Skin: Negative for rash and wound. Neurological: Negative for dizziness, seizures, weakness, light-headedness and headaches. Psychiatric/Behavioral: Negative for dysphoric mood, sleep disturbance and suicidal ideas. The patient is not nervous/anxious. Objective:     /80   Pulse 74   Resp 16   Ht 6' 2.02\" (1.88 m)   Wt 284 lb (128.8 kg)   SpO2 97%   BMI 36.45 kg/m²     Physical Exam   Constitutional: He is oriented to person, place, and time. He appears well-developed and well-nourished. No distress. HENT:   Head: Normocephalic and atraumatic. Eyes: Pupils are equal, round, and reactive to light. Neck: Normal range of motion. Neck supple. No JVD present. No thyromegaly present. Cardiovascular: Normal rate, regular rhythm, normal heart sounds and intact distal pulses. Exam reveals no gallop and no friction rub. No murmur heard. Pulmonary/Chest: Effort normal and breath sounds normal. No respiratory distress. He has no wheezes. He has no rales. Abdominal: Soft. Bowel sounds are normal. He exhibits no distension. There is no tenderness. Musculoskeletal: Normal range of motion. He exhibits no edema or tenderness. Lymphadenopathy:     He has no cervical adenopathy. Neurological: He is alert and oriented to person, place, and time.    Skin: Skin is

## 2019-10-11 DIAGNOSIS — I42.9 CARDIOMYOPATHY (HCC): ICD-10-CM

## 2019-10-11 DIAGNOSIS — I10 ESSENTIAL HYPERTENSION: ICD-10-CM

## 2019-10-11 RX ORDER — POTASSIUM CHLORIDE 20 MEQ/1
TABLET, EXTENDED RELEASE ORAL
Qty: 28 TABLET | Refills: 5 | Status: SHIPPED | OUTPATIENT
Start: 2019-10-11 | End: 2019-11-06 | Stop reason: SDUPTHER

## 2019-10-11 RX ORDER — CARVEDILOL 25 MG/1
TABLET ORAL
Qty: 56 TABLET | Refills: 5 | Status: SHIPPED | OUTPATIENT
Start: 2019-10-11 | End: 2020-03-27

## 2019-11-06 DIAGNOSIS — I10 ESSENTIAL HYPERTENSION: ICD-10-CM

## 2019-11-06 DIAGNOSIS — K59.00 CONSTIPATION, UNSPECIFIED CONSTIPATION TYPE: ICD-10-CM

## 2019-11-07 RX ORDER — POTASSIUM CHLORIDE 20 MEQ/1
TABLET, EXTENDED RELEASE ORAL
Qty: 28 TABLET | Refills: 5 | Status: SHIPPED | OUTPATIENT
Start: 2019-11-07 | End: 2020-08-24 | Stop reason: SDUPTHER

## 2019-11-07 RX ORDER — DOCUSATE SODIUM 100 MG/1
CAPSULE, LIQUID FILLED ORAL
Qty: 28 CAPSULE | Refills: 5 | Status: SHIPPED | OUTPATIENT
Start: 2019-11-07 | End: 2021-07-28 | Stop reason: SDUPTHER

## 2019-11-18 ENCOUNTER — ANESTHESIA (OUTPATIENT)
Dept: ENDOSCOPY | Age: 54
End: 2019-11-18
Payer: MEDICARE

## 2019-11-18 ENCOUNTER — HOSPITAL ENCOUNTER (OUTPATIENT)
Age: 54
Setting detail: OUTPATIENT SURGERY
Discharge: HOME | End: 2019-11-18
Attending: INTERNAL MEDICINE | Admitting: INTERNAL MEDICINE
Payer: MEDICARE

## 2019-11-18 ENCOUNTER — ANESTHESIA EVENT (OUTPATIENT)
Dept: ENDOSCOPY | Age: 54
End: 2019-11-18
Payer: MEDICARE

## 2019-11-18 VITALS
SYSTOLIC BLOOD PRESSURE: 141 MMHG | HEART RATE: 70 BPM | HEIGHT: 74 IN | BODY MASS INDEX: 36.06 KG/M2 | RESPIRATION RATE: 20 BRPM | TEMPERATURE: 97.3 F | DIASTOLIC BLOOD PRESSURE: 89 MMHG | OXYGEN SATURATION: 95 % | WEIGHT: 281 LBS

## 2019-11-18 VITALS
OXYGEN SATURATION: 97 % | SYSTOLIC BLOOD PRESSURE: 119 MMHG | RESPIRATION RATE: 29 BRPM | DIASTOLIC BLOOD PRESSURE: 68 MMHG

## 2019-11-18 PROCEDURE — 3609010600 HC COLONOSCOPY POLYPECTOMY SNARE/COLD BIOPSY: Performed by: INTERNAL MEDICINE

## 2019-11-18 PROCEDURE — 7100000000 HC PACU RECOVERY - FIRST 15 MIN: Performed by: INTERNAL MEDICINE

## 2019-11-18 PROCEDURE — 2709999900 HC NON-CHARGEABLE SUPPLY: Performed by: INTERNAL MEDICINE

## 2019-11-18 PROCEDURE — 3700000000 HC ANESTHESIA ATTENDED CARE: Performed by: INTERNAL MEDICINE

## 2019-11-18 PROCEDURE — 88305 TISSUE EXAM BY PATHOLOGIST: CPT

## 2019-11-18 PROCEDURE — 7100000001 HC PACU RECOVERY - ADDTL 15 MIN: Performed by: INTERNAL MEDICINE

## 2019-11-18 PROCEDURE — 2580000003 HC RX 258: Performed by: INTERNAL MEDICINE

## 2019-11-18 PROCEDURE — 6360000002 HC RX W HCPCS: Performed by: NURSE ANESTHETIST, CERTIFIED REGISTERED

## 2019-11-18 PROCEDURE — 3700000001 HC ADD 15 MINUTES (ANESTHESIA): Performed by: INTERNAL MEDICINE

## 2019-11-18 RX ORDER — PROPOFOL 10 MG/ML
INJECTION, EMULSION INTRAVENOUS PRN
Status: DISCONTINUED | OUTPATIENT
Start: 2019-11-18 | End: 2019-11-18 | Stop reason: SDUPTHER

## 2019-11-18 RX ORDER — SODIUM CHLORIDE 450 MG/100ML
INJECTION, SOLUTION INTRAVENOUS CONTINUOUS
Status: DISCONTINUED | OUTPATIENT
Start: 2019-11-18 | End: 2019-11-18 | Stop reason: HOSPADM

## 2019-11-18 RX ADMIN — SODIUM CHLORIDE: 4.5 INJECTION, SOLUTION INTRAVENOUS at 10:47

## 2019-11-18 RX ADMIN — PROPOFOL 400 MG: 10 INJECTION, EMULSION INTRAVENOUS at 12:00

## 2019-11-18 ASSESSMENT — PAIN - FUNCTIONAL ASSESSMENT: PAIN_FUNCTIONAL_ASSESSMENT: 0-10

## 2019-11-18 ASSESSMENT — PAIN SCALES - GENERAL
PAINLEVEL_OUTOF10: 0
PAINLEVEL_OUTOF10: 0

## 2019-11-18 ASSESSMENT — COPD QUESTIONNAIRES: CAT_SEVERITY: NO INTERVAL CHANGE

## 2019-12-06 DIAGNOSIS — I10 ESSENTIAL HYPERTENSION: ICD-10-CM

## 2019-12-14 RX ORDER — MELOXICAM 7.5 MG/1
TABLET ORAL
Qty: 28 TABLET | Refills: 3 | Status: SHIPPED | OUTPATIENT
Start: 2019-12-14 | End: 2020-03-27

## 2019-12-14 RX ORDER — HYDROCHLOROTHIAZIDE 25 MG/1
25 TABLET ORAL DAILY
Qty: 28 TABLET | Refills: 5 | Status: SHIPPED | OUTPATIENT
Start: 2019-12-14 | End: 2020-05-26

## 2019-12-20 ENCOUNTER — OFFICE VISIT (OUTPATIENT)
Dept: INTERNAL MEDICINE CLINIC | Age: 54
End: 2019-12-20
Payer: MEDICARE

## 2019-12-20 VITALS
DIASTOLIC BLOOD PRESSURE: 82 MMHG | WEIGHT: 282.6 LBS | HEIGHT: 74 IN | HEART RATE: 79 BPM | SYSTOLIC BLOOD PRESSURE: 125 MMHG | BODY MASS INDEX: 36.27 KG/M2 | OXYGEN SATURATION: 97 %

## 2019-12-20 DIAGNOSIS — K59.00 CONSTIPATION, UNSPECIFIED CONSTIPATION TYPE: ICD-10-CM

## 2019-12-20 DIAGNOSIS — J44.9 CHRONIC OBSTRUCTIVE PULMONARY DISEASE, UNSPECIFIED COPD TYPE (HCC): ICD-10-CM

## 2019-12-20 DIAGNOSIS — I42.9 CARDIOMYOPATHY, UNSPECIFIED TYPE (HCC): ICD-10-CM

## 2019-12-20 DIAGNOSIS — F20.0 PARANOID SCHIZOPHRENIA (HCC): ICD-10-CM

## 2019-12-20 DIAGNOSIS — Z72.0 TOBACCO ABUSE: ICD-10-CM

## 2019-12-20 DIAGNOSIS — G47.33 OBSTRUCTIVE SLEEP APNEA: ICD-10-CM

## 2019-12-20 DIAGNOSIS — E66.9 OBESITY (BMI 30-39.9): ICD-10-CM

## 2019-12-20 DIAGNOSIS — I10 ESSENTIAL HYPERTENSION: Primary | ICD-10-CM

## 2019-12-20 PROCEDURE — 3017F COLORECTAL CA SCREEN DOC REV: CPT | Performed by: NURSE PRACTITIONER

## 2019-12-20 PROCEDURE — G8417 CALC BMI ABV UP PARAM F/U: HCPCS | Performed by: NURSE PRACTITIONER

## 2019-12-20 PROCEDURE — G8484 FLU IMMUNIZE NO ADMIN: HCPCS | Performed by: NURSE PRACTITIONER

## 2019-12-20 PROCEDURE — 93000 ELECTROCARDIOGRAM COMPLETE: CPT | Performed by: NURSE PRACTITIONER

## 2019-12-20 PROCEDURE — G8598 ASA/ANTIPLAT THER USED: HCPCS | Performed by: NURSE PRACTITIONER

## 2019-12-20 PROCEDURE — 4004F PT TOBACCO SCREEN RCVD TLK: CPT | Performed by: NURSE PRACTITIONER

## 2019-12-20 PROCEDURE — G8427 DOCREV CUR MEDS BY ELIG CLIN: HCPCS | Performed by: NURSE PRACTITIONER

## 2019-12-20 PROCEDURE — G8926 SPIRO NO PERF OR DOC: HCPCS | Performed by: NURSE PRACTITIONER

## 2019-12-20 PROCEDURE — 3023F SPIROM DOC REV: CPT | Performed by: NURSE PRACTITIONER

## 2019-12-20 PROCEDURE — 99214 OFFICE O/P EST MOD 30 MIN: CPT | Performed by: NURSE PRACTITIONER

## 2019-12-20 RX ORDER — VARENICLINE TARTRATE 0.5 MG/1
.5-1 TABLET, FILM COATED ORAL SEE ADMIN INSTRUCTIONS
Qty: 57 TABLET | Refills: 0 | Status: SHIPPED | OUTPATIENT
Start: 2019-12-20 | End: 2020-03-12

## 2019-12-20 RX ORDER — ALBUTEROL SULFATE 90 UG/1
2 AEROSOL, METERED RESPIRATORY (INHALATION) EVERY 6 HOURS PRN
Qty: 1 INHALER | Refills: 5 | Status: SHIPPED | OUTPATIENT
Start: 2019-12-20 | End: 2022-02-16 | Stop reason: SDUPTHER

## 2019-12-20 ASSESSMENT — ENCOUNTER SYMPTOMS
SORE THROAT: 0
TROUBLE SWALLOWING: 0
NAUSEA: 0
CONSTIPATION: 0
CHOKING: 0
EYE ITCHING: 0
DIARRHEA: 0
COUGH: 0
ABDOMINAL PAIN: 0
VOMITING: 0
SHORTNESS OF BREATH: 0

## 2020-01-16 ENCOUNTER — OFFICE VISIT (OUTPATIENT)
Dept: INTERNAL MEDICINE CLINIC | Age: 55
End: 2020-01-16
Payer: MEDICARE

## 2020-01-16 VITALS
HEART RATE: 73 BPM | SYSTOLIC BLOOD PRESSURE: 115 MMHG | BODY MASS INDEX: 36.7 KG/M2 | RESPIRATION RATE: 12 BRPM | WEIGHT: 286 LBS | DIASTOLIC BLOOD PRESSURE: 80 MMHG | HEIGHT: 74 IN | OXYGEN SATURATION: 95 %

## 2020-01-16 PROCEDURE — 99213 OFFICE O/P EST LOW 20 MIN: CPT | Performed by: NURSE PRACTITIONER

## 2020-01-16 PROCEDURE — G8427 DOCREV CUR MEDS BY ELIG CLIN: HCPCS | Performed by: NURSE PRACTITIONER

## 2020-01-16 PROCEDURE — G8417 CALC BMI ABV UP PARAM F/U: HCPCS | Performed by: NURSE PRACTITIONER

## 2020-01-16 PROCEDURE — 3017F COLORECTAL CA SCREEN DOC REV: CPT | Performed by: NURSE PRACTITIONER

## 2020-01-16 PROCEDURE — G8484 FLU IMMUNIZE NO ADMIN: HCPCS | Performed by: NURSE PRACTITIONER

## 2020-01-16 PROCEDURE — 4004F PT TOBACCO SCREEN RCVD TLK: CPT | Performed by: NURSE PRACTITIONER

## 2020-01-16 ASSESSMENT — ENCOUNTER SYMPTOMS
NAUSEA: 0
CONSTIPATION: 0
SINUS PAIN: 0
SHORTNESS OF BREATH: 0
ABDOMINAL DISTENTION: 0
BLOOD IN STOOL: 0
SINUS PRESSURE: 0
TROUBLE SWALLOWING: 0
COUGH: 0
PHOTOPHOBIA: 0
RHINORRHEA: 0
WHEEZING: 0
SORE THROAT: 0
VOMITING: 0
ABDOMINAL PAIN: 0
DIARRHEA: 0

## 2020-01-16 NOTE — PROGRESS NOTES
aclidinium (TUDORZA PRESSAIR) 400 MCG/ACT AEPB inhaler Inhale 1 puff into the lungs 2 times daily Yes JASKARAN Bernal CNP   fluticasone-salmeterol (WIXELA INHUB) 100-50 MCG/DOSE diskus inhaler Inhale 1 puff into the lungs every 12 hours Yes JASKARAN Tapia CNP   tiotropium (SPIRIVA HANDIHALER) 18 MCG inhalation capsule Inhale 1 capsule into the lungs daily Yes JASKARAN Bernal CNP   albuterol sulfate HFA (PROVENTIL HFA) 108 (90 Base) MCG/ACT inhaler Inhale 2 puffs into the lungs every 6 hours as needed for Wheezing or Shortness of Breath Yes JASKARAN Bernal CNP   fluticasone-salmeterol (ADVAIR) 100-50 MCG/DOSE diskus inhaler Inhale 1 puff into the lungs every 12 hours Yes JASKARAN Tapia CNP   varenicline (CHANTIX) 0.5 MG tablet Take 1-2 tablets by mouth See Admin Instructions 0.5mg DAILY for 3 days followed by 0.5mg TWICE DAILY for 4 days followed by 1mg TWICE DAILY Yes JASKARAN Tapia CNP   meloxicam (MOBIC) 7.5 MG tablet TAKE 1 TABLET BY MOUTH DAILY Yes Gregory Jordan MD   hydrochlorothiazide (HYDRODIURIL) 25 MG tablet TAKE 1 TABLET BY MOUTH DAILY Yes Gregory Jordan MD   potassium chloride (KLOR-CON M) 20 MEQ extended release tablet TAKE 1 TABLET BY MOUTH DAILY. Yes JASKARAN Bernal CNP    MG capsule TAKE 1 CAPSULE BY MOUTH DAILY.  Yes JASKARAN Tapia CNP   carvedilol (COREG) 25 MG tablet TAKE 1 TABLET BY MOUTH 2 TIMES DAILY Yes Gregory Jordan MD   allopurinol (ZYLOPRIM) 300 MG tablet Take 300 mg by mouth daily Yes Historical Provider, MD   losartan (COZAAR) 50 MG tablet Take 100 mg by mouth daily  Yes Historical Provider, MD   guaiFENesin (MUCINEX) 600 MG SR tablet Take 1 tablet by mouth 2 times daily as needed for Congestion Yes JASKARAN Driver CNP   divalproex (DEPAKOTE ER) 500 MG ER tablet Take 1,500 mg by mouth nightly Yes Historical Provider, MD   paliperidone palmitate (Melanie Antis) 156 MG/ML SUSP IM injection Inject 156 mg into the muscle every 30 days. Yes Historical Provider, MD   aspirin 81 MG chewable tablet Take 81 mg by mouth daily. Yes Historical Provider, MD        Social History     Tobacco Use    Smoking status: Current Every Day Smoker     Packs/day: 1.00     Years: 15.00     Pack years: 15.00     Types: Cigarettes     Start date: 1/1/1982    Smokeless tobacco: Never Used   Substance Use Topics    Alcohol use: No     Alcohol/week: 0.0 standard drinks     Comment: 3 days a week        Vitals:    01/16/20 0809   BP: 115/80   Site: Left Upper Arm   Position: Sitting   Cuff Size: Large Adult   Pulse: 73   Resp: 12   SpO2: 95%   Weight: 286 lb (129.7 kg)   Height: 6' 2.02\" (1.88 m)     Estimated body mass index is 36.7 kg/m² as calculated from the following:    Height as of this encounter: 6' 2.02\" (1.88 m). Weight as of this encounter: 286 lb (129.7 kg). Physical Exam  Constitutional:       General: He is not in acute distress. Appearance: Normal appearance. He is normal weight. He is not ill-appearing. HENT:      Head: Normocephalic and atraumatic. Right Ear: Tympanic membrane, ear canal and external ear normal.      Left Ear: Tympanic membrane, ear canal and external ear normal.      Nose: Nose normal. No congestion or rhinorrhea. Mouth/Throat:      Mouth: Mucous membranes are moist.      Pharynx: Oropharynx is clear. No oropharyngeal exudate or posterior oropharyngeal erythema. Eyes:      Extraocular Movements: Extraocular movements intact. Conjunctiva/sclera: Conjunctivae normal.      Pupils: Pupils are equal, round, and reactive to light. Neck:      Musculoskeletal: Normal range of motion and neck supple. No muscular tenderness. Vascular: No carotid bruit. Cardiovascular:      Rate and Rhythm: Normal rate and regular rhythm. Pulses: Normal pulses. Heart sounds: No murmur. No friction rub. No gallop.     Pulmonary:      Effort:

## 2020-01-16 NOTE — PATIENT INSTRUCTIONS
Discuss wellbutrin with psychiatry for smoking cessation. Patient Education        Stopping Smoking: Care Instructions  Your Care Instructions  Cigarette smokers crave the nicotine in cigarettes. Giving it up is much harder than simply changing a habit. Your body has to stop craving the nicotine. It is hard to quit, but you can do it. There are many tools that people use to quit smoking. You may find that combining tools works best for you. There are several steps to quitting. First you get ready to quit. Then you get support to help you. After that, you learn new skills and behaviors to become a nonsmoker. For many people, a necessary step is getting and using medicine. Your doctor will help you set up the plan that best meets your needs. You may want to attend a smoking cessation program to help you quit smoking. When you choose a program, look for one that has proven success. Ask your doctor for ideas. You will greatly increase your chances of success if you take medicine as well as get counseling or join a cessation program.  Some of the changes you feel when you first quit tobacco are uncomfortable. Your body will miss the nicotine at first, and you may feel short-tempered and grumpy. You may have trouble sleeping or concentrating. Medicine can help you deal with these symptoms. You may struggle with changing your smoking habits and rituals. The last step is the tricky one: Be prepared for the smoking urge to continue for a time. This is a lot to deal with, but keep at it. You will feel better. Follow-up care is a key part of your treatment and safety. Be sure to make and go to all appointments, and call your doctor if you are having problems. It's also a good idea to know your test results and keep a list of the medicines you take. How can you care for yourself at home? · Ask your family, friends, and coworkers for support. You have a better chance of quitting if you have help and support.   · Join a support group, such as Nicotine Anonymous, for people who are trying to quit smoking. · Consider signing up for a smoking cessation program, such as the American Lung Association's Freedom from Smoking program.  · Get text messaging support. Go to the website at www.smokefree. gov to sign up for the [facility] program.  · Set a quit date. Pick your date carefully so that it is not right in the middle of a big deadline or stressful time. Once you quit, do not even take a puff. Get rid of all ashtrays and lighters after your last cigarette. Clean your house and your clothes so that they do not smell of smoke. · Learn how to be a nonsmoker. Think about ways you can avoid those things that make you reach for a cigarette. ? Avoid situations that put you at greatest risk for smoking. For some people, it is hard to have a drink with friends without smoking. For others, they might skip a coffee break with coworkers who smoke. ? Change your daily routine. Take a different route to work or eat a meal in a different place. · Cut down on stress. Calm yourself or release tension by doing an activity you enjoy, such as reading a book, taking a hot bath, or gardening. · Talk to your doctor or pharmacist about nicotine replacement therapy, which replaces the nicotine in your body. You still get nicotine but you do not use tobacco. Nicotine replacement products help you slowly reduce the amount of nicotine you need. These products come in several forms, many of them available over-the-counter:  ? Nicotine patches  ? Nicotine gum and lozenges  ? Nicotine inhaler  · Ask your doctor about bupropion (Wellbutrin) or varenicline (Chantix), which are prescription medicines. They do not contain nicotine. They help you by reducing withdrawal symptoms, such as stress and anxiety. · Some people find hypnosis, acupuncture, and massage helpful for ending the smoking habit. · Eat a healthy diet and get regular exercise.  Having healthy habits will help your body move past its craving for nicotine. · Be prepared to keep trying. Most people are not successful the first few times they try to quit. Do not get mad at yourself if you smoke again. Make a list of things you learned and think about when you want to try again, such as next week, next month, or next year. Where can you learn more? Go to https://[employer].DoApp. org and sign in to your [employer] account. Enter J498 in the emoteShare box to learn more about \"Stopping Smoking: Care Instructions. \"     If you do not have an account, please click on the \"Sign Up Now\" link. Current as of: July 4, 2019  Content Version: 12.3  © 2876-3559 [employer]. Care instructions adapted under license by [facility] ([facility]). If you have questions about a medical condition or this instruction, always ask your healthcare professional. [place] [address] any warranty or liability for your use of this information.

## 2020-03-12 ENCOUNTER — OFFICE VISIT (OUTPATIENT)
Dept: INTERNAL MEDICINE CLINIC | Age: 55
End: 2020-03-12
Payer: MEDICARE

## 2020-03-12 VITALS
HEIGHT: 74 IN | WEIGHT: 302 LBS | DIASTOLIC BLOOD PRESSURE: 80 MMHG | HEART RATE: 80 BPM | BODY MASS INDEX: 38.76 KG/M2 | SYSTOLIC BLOOD PRESSURE: 138 MMHG

## 2020-03-12 PROCEDURE — G8427 DOCREV CUR MEDS BY ELIG CLIN: HCPCS | Performed by: NURSE PRACTITIONER

## 2020-03-12 PROCEDURE — 3017F COLORECTAL CA SCREEN DOC REV: CPT | Performed by: NURSE PRACTITIONER

## 2020-03-12 PROCEDURE — G8926 SPIRO NO PERF OR DOC: HCPCS | Performed by: NURSE PRACTITIONER

## 2020-03-12 PROCEDURE — G8484 FLU IMMUNIZE NO ADMIN: HCPCS | Performed by: NURSE PRACTITIONER

## 2020-03-12 PROCEDURE — 3023F SPIROM DOC REV: CPT | Performed by: NURSE PRACTITIONER

## 2020-03-12 PROCEDURE — 99214 OFFICE O/P EST MOD 30 MIN: CPT | Performed by: NURSE PRACTITIONER

## 2020-03-12 PROCEDURE — 4004F PT TOBACCO SCREEN RCVD TLK: CPT | Performed by: NURSE PRACTITIONER

## 2020-03-12 PROCEDURE — G8417 CALC BMI ABV UP PARAM F/U: HCPCS | Performed by: NURSE PRACTITIONER

## 2020-03-12 NOTE — PROGRESS NOTES
Cassandra Wolf  INTERNAL MEDICINE  [address] [facility] [address]  Dept: [phone]  Dept Fax: [phone]  Loc: [phone]     Visit Date:  3/12/2020    Patient:  Ministerio Betancur  YOB: 1965    HPI:     Chief Complaint   Patient presents with    Other     Tobacco cessation       Here to recheck his progress with smoking cessation  Saw KD 1/16/2020  Unable to stop smoking cigarettes on the Chantix, states he took 6 days and then stopped the Chantix and Wellbutrin was unable to get. Tried gum without success. COPD - Having trouble with coverage of one of his inhalers, considering cost. Dealing with pharmacy. HTN - BP today 138/80. Medications    Current Outpatient Medications:     fluticasone-salmeterol (WIXELA INHUB) 100-50 MCG/DOSE diskus inhaler, Inhale 1 puff into the lungs every 12 hours, Disp: 60 each, Rfl: 5    tiotropium (SPIRIVA HANDIHALER) 18 MCG inhalation capsule, Inhale 1 capsule into the lungs daily, Disp: 30 capsule, Rfl: 5    fluticasone-salmeterol (ADVAIR) 100-50 MCG/DOSE diskus inhaler, Inhale 1 puff into the lungs every 12 hours, Disp: 60 each, Rfl: 5    meloxicam (MOBIC) 7.5 MG tablet, TAKE 1 TABLET BY MOUTH DAILY, Disp: 28 tablet, Rfl: 3    hydrochlorothiazide (HYDRODIURIL) 25 MG tablet, TAKE 1 TABLET BY MOUTH DAILY, Disp: 28 tablet, Rfl: 5    potassium chloride (KLOR-CON M) 20 MEQ extended release tablet, TAKE 1 TABLET BY MOUTH DAILY. , Disp: 28 tablet, Rfl: 5     MG capsule, TAKE 1 CAPSULE BY MOUTH DAILY. , Disp: 28 capsule, Rfl: 5    carvedilol (COREG) 25 MG tablet, TAKE 1 TABLET BY MOUTH 2 TIMES DAILY, Disp: 56 tablet, Rfl: 5    allopurinol (ZYLOPRIM) 300 MG tablet, Take 300 mg by mouth daily, Disp: , Rfl:     losartan (COZAAR) 50 MG tablet, Take 100 mg by mouth daily , Disp: , Rfl:     guaiFENesin (MUCINEX) 600 MG SR tablet, Take 1 tablet by mouth 2 times daily as needed for vaccine (2 - Td) 06/03/2025    Colon cancer screen colonoscopy  11/18/2029    Hepatitis A vaccine  Aged Out    Hepatitis B vaccine  Aged Out    Hib vaccine  Aged Out    Meningococcal (ACWY) vaccine  Aged Out       Subjective:      Review of Systems   Constitutional: Negative for chills, fatigue and fever. HENT: Negative for sore throat and trouble swallowing. Eyes: Negative for itching and visual disturbance. Respiratory: Negative for cough, choking and shortness of breath. Cardiovascular: Negative for chest pain and leg swelling. Gastrointestinal: Negative for abdominal pain, constipation, diarrhea, nausea and vomiting. Endocrine: Negative for cold intolerance and heat intolerance. Genitourinary: Negative for difficulty urinating and dysuria. Musculoskeletal: Negative for arthralgias and myalgias. Skin: Negative for rash and wound. Neurological: Negative for dizziness, tremors, weakness, light-headedness, numbness and headaches. Psychiatric/Behavioral: Negative for agitation, dysphoric mood, sleep disturbance and suicidal ideas. The patient is not nervous/anxious. Objective:     /80 (Site: Right Upper Arm, Position: Sitting, Cuff Size: Large Adult)   Pulse 80   Ht 6' 2\" (1.88 m)   Wt (!) 302 lb (137 kg)   BMI 38.77 kg/m²     Physical Exam  Vitals signs reviewed. Constitutional:       General: He is not in acute distress. Appearance: He is well-developed. He is not diaphoretic. HENT:      Head: Normocephalic and atraumatic. Mouth/Throat:      Pharynx: No oropharyngeal exudate. Eyes:      General: No scleral icterus. Right eye: No discharge. Left eye: No discharge. Pupils: Pupils are equal, round, and reactive to light. Neck:      Musculoskeletal: Normal range of motion and neck supple. Thyroid: No thyromegaly. Cardiovascular:      Rate and Rhythm: Normal rate and regular rhythm. Heart sounds: Normal heart sounds.  No

## 2020-03-17 ENCOUNTER — TELEPHONE (OUTPATIENT)
Dept: PHARMACY | Age: 55
End: 2020-03-17

## 2020-03-18 ENCOUNTER — TELEPHONE (OUTPATIENT)
Dept: INTERNAL MEDICINE CLINIC | Age: 55
End: 2020-03-18

## 2020-03-18 NOTE — TELEPHONE ENCOUNTER
I called pt again today just to let him know we can and will be doing smoking cessation by phone at this time if he wants to do it that way or if he prefers he can wait till face to face visits resume. Pt stated he wants to wait till face to face visits resume but pt wanted the education book so I am mailing the pt a book. I told pt we will be in touch once we resume visits. Pt had no questions at this time.

## 2020-03-18 NOTE — TELEPHONE ENCOUNTER
Cancelled order for smoking cessation management, placed new order for education with clinic to call my office for medications.

## 2020-03-20 ASSESSMENT — ENCOUNTER SYMPTOMS
EYE ITCHING: 0
NAUSEA: 0
DIARRHEA: 0
CHOKING: 0
TROUBLE SWALLOWING: 0
SHORTNESS OF BREATH: 0
SORE THROAT: 0
ABDOMINAL PAIN: 0
VOMITING: 0
CONSTIPATION: 0
COUGH: 0

## 2020-03-27 RX ORDER — MELOXICAM 7.5 MG/1
TABLET ORAL
Qty: 28 TABLET | Refills: 3 | Status: SHIPPED | OUTPATIENT
Start: 2020-03-27 | End: 2020-08-24 | Stop reason: SDUPTHER

## 2020-03-27 RX ORDER — CARVEDILOL 25 MG/1
TABLET ORAL
Qty: 56 TABLET | Refills: 5 | Status: SHIPPED | OUTPATIENT
Start: 2020-03-27 | End: 2021-07-28 | Stop reason: SDUPTHER

## 2020-05-13 ENCOUNTER — TELEPHONE (OUTPATIENT)
Dept: PHARMACY | Age: 55
End: 2020-05-13

## 2020-05-27 ENCOUNTER — TELEPHONE (OUTPATIENT)
Dept: PHARMACY | Age: 55
End: 2020-05-27

## 2020-05-27 RX ORDER — HYDROCHLOROTHIAZIDE 25 MG/1
25 TABLET ORAL DAILY
Qty: 28 TABLET | Refills: 5 | Status: SHIPPED | OUTPATIENT
Start: 2020-05-27 | End: 2020-09-02 | Stop reason: SDUPTHER

## 2020-05-27 NOTE — TELEPHONE ENCOUNTER
Medication Management   [facility]. [facility]ation [facility]  [phone] (phone)  [phone] (fax)    I called pt back today to update him on our smoking cessation program due to referral from KEILA Joyce CNP. I spoke with pt and explained that we are still not doing in person visits due to COVID-19 and that program is being done via telephone visits only. I offered to pt to start program at this time via telephone visits but pt states he still wants to wait till in person visits resume. I told pt that we do no know when that will be and that it could be months and he states he still wants to wait. I told pt we will be in touch when in person visits resume and if they haven't resumed in a couple months that we will check back with him again to see if he still wants to wait and pt was okay with that. Pt had no questions at this time.

## 2020-06-18 ENCOUNTER — OFFICE VISIT (OUTPATIENT)
Dept: INTERNAL MEDICINE CLINIC | Age: 55
End: 2020-06-18
Payer: MEDICARE

## 2020-06-18 VITALS
RESPIRATION RATE: 12 BRPM | HEART RATE: 78 BPM | BODY MASS INDEX: 37.35 KG/M2 | SYSTOLIC BLOOD PRESSURE: 130 MMHG | HEIGHT: 74 IN | TEMPERATURE: 97.2 F | WEIGHT: 291 LBS | DIASTOLIC BLOOD PRESSURE: 89 MMHG

## 2020-06-18 PROCEDURE — 99214 OFFICE O/P EST MOD 30 MIN: CPT | Performed by: NURSE PRACTITIONER

## 2020-06-18 PROCEDURE — G8417 CALC BMI ABV UP PARAM F/U: HCPCS | Performed by: NURSE PRACTITIONER

## 2020-06-18 PROCEDURE — 93000 ELECTROCARDIOGRAM COMPLETE: CPT | Performed by: NURSE PRACTITIONER

## 2020-06-18 PROCEDURE — G8926 SPIRO NO PERF OR DOC: HCPCS | Performed by: NURSE PRACTITIONER

## 2020-06-18 PROCEDURE — 4004F PT TOBACCO SCREEN RCVD TLK: CPT | Performed by: NURSE PRACTITIONER

## 2020-06-18 PROCEDURE — G8427 DOCREV CUR MEDS BY ELIG CLIN: HCPCS | Performed by: NURSE PRACTITIONER

## 2020-06-18 PROCEDURE — 3017F COLORECTAL CA SCREEN DOC REV: CPT | Performed by: NURSE PRACTITIONER

## 2020-06-18 PROCEDURE — 3023F SPIROM DOC REV: CPT | Performed by: NURSE PRACTITIONER

## 2020-06-18 ASSESSMENT — ENCOUNTER SYMPTOMS
SORE THROAT: 0
CHOKING: 0
TROUBLE SWALLOWING: 0
ABDOMINAL PAIN: 0
NAUSEA: 0
DIARRHEA: 0
COUGH: 0
CONSTIPATION: 0
VOMITING: 0
SHORTNESS OF BREATH: 0

## 2020-06-30 LAB
CHOLESTEROL, TOTAL: 173 MG/DL
CHOLESTEROL/HDL RATIO: NORMAL
HDLC SERPL-MCNC: 37 MG/DL (ref 35–70)
LDL CHOLESTEROL CALCULATED: 109 MG/DL (ref 0–160)
NONHDLC SERPL-MCNC: NORMAL MG/DL
TRIGL SERPL-MCNC: 134 MG/DL
VLDLC SERPL CALC-MCNC: 27 MG/DL

## 2020-07-02 ENCOUNTER — TELEPHONE (OUTPATIENT)
Dept: INTERNAL MEDICINE CLINIC | Age: 55
End: 2020-07-02

## 2020-07-02 RX ORDER — ATORVASTATIN CALCIUM 20 MG/1
20 TABLET, FILM COATED ORAL DAILY
Qty: 30 TABLET | Refills: 3 | Status: SHIPPED | OUTPATIENT
Start: 2020-07-02 | End: 2021-01-04 | Stop reason: SDUPTHER

## 2020-07-02 NOTE — TELEPHONE ENCOUNTER
----- Message from Clemente Client, APRN - CNP sent at 7/1/2020  2:19 PM EDT -----  HDL 37, , would like LDL to be less than 70 in patients with established heart disease. Lipitor 20 mg daily, call if any muscle pain or other problems.

## 2020-07-29 ENCOUNTER — TELEPHONE (OUTPATIENT)
Dept: PHARMACY | Age: 55
End: 2020-07-29

## 2020-07-29 NOTE — TELEPHONE ENCOUNTER
Medication Management   [facility]. [facility]  Smoking Cessation Clinic  [phone] (phone)  [phone] (fax)     I called pt back today to update him on our smoking cessation program due to referral from KEILA Joyce CNP. I spoke with pt and explained that we are now doing in person visits for our smoking cessation program and asked if he was interested in getting started in the program.  Pt states he does want to quit and wants to do the program but due to COVID-19 he does not want to be coming in to the hospital yet at this time. I again reminded him that we can do program by phone visits but pt again states he wants to do in person. I asked pt if he wants to call us when he is ready and feels comfortable coming into the hospital.  Pt states he would like us to call back in a couple months so I told him we will call back in a couple months. Pt also states when he does the program that he would need help with transportation and I told him we could help with that through [address]. Pt had no questions at this time.

## 2020-07-30 ENCOUNTER — HOSPITAL ENCOUNTER (OUTPATIENT)
Dept: NON INVASIVE DIAGNOSTICS | Age: 55
Discharge: HOME | End: 2020-07-30
Payer: MEDICARE

## 2020-07-30 LAB
LV EF: 55 %
LVEF MODALITY: NORMAL

## 2020-07-30 PROCEDURE — 93306 TTE W/DOPPLER COMPLETE: CPT

## 2020-07-31 ENCOUNTER — TELEPHONE (OUTPATIENT)
Dept: INTERNAL MEDICINE CLINIC | Age: 55
End: 2020-07-31

## 2020-07-31 NOTE — TELEPHONE ENCOUNTER
----- Message from JASKARAN Chavez CNP sent at 7/30/2020  5:03 PM EDT -----  Echo looks good, EF normal range.

## 2020-08-24 RX ORDER — POTASSIUM CHLORIDE 20 MEQ/1
TABLET, EXTENDED RELEASE ORAL
Qty: 28 TABLET | Refills: 5 | Status: SHIPPED | OUTPATIENT
Start: 2020-08-24 | End: 2021-02-04 | Stop reason: SDUPTHER

## 2020-08-24 RX ORDER — MELOXICAM 7.5 MG/1
TABLET ORAL
Qty: 28 TABLET | Refills: 3 | Status: SHIPPED | OUTPATIENT
Start: 2020-08-24 | End: 2022-02-16

## 2020-09-02 RX ORDER — HYDROCHLOROTHIAZIDE 25 MG/1
25 TABLET ORAL DAILY
Qty: 90 TABLET | Refills: 1 | Status: SHIPPED | OUTPATIENT
Start: 2020-09-02 | End: 2021-03-10 | Stop reason: SDUPTHER

## 2020-09-30 NOTE — TELEPHONE ENCOUNTER
I called pt back today regarding the smoking cessation program.  The pt stated he is interested and ready to get started but would prefer to do the whole program by phone due to Rushie Shark. I explained the program again to the pt and that we can do it all by phone. We set up a visit via telephone for 10/7/2020 at 10am for the pts first 1-on-1 visit. I also verified the pts address and told him that I will mailing him some information and handouts for the program. The pt had no questions at this time.

## 2020-10-07 ENCOUNTER — TELEPHONE (OUTPATIENT)
Dept: INTERNAL MEDICINE CLINIC | Age: 55
End: 2020-10-07

## 2020-10-07 ENCOUNTER — HOSPITAL ENCOUNTER (OUTPATIENT)
Dept: PHARMACY | Age: 55
Setting detail: THERAPIES SERIES
Discharge: HOME | End: 2020-10-07
Payer: MEDICARE

## 2020-10-07 RX ORDER — LOSARTAN POTASSIUM 100 MG/1
100 TABLET ORAL DAILY
COMMUNITY
Start: 2020-08-13

## 2020-10-07 NOTE — PROGRESS NOTES
Medication Management   [facility]. [facility]  Smoking Cessation Clinic  [phone] (phone)  [phone] (fax)    Krystal Macdonald is a 48 y.o. male has been referred to our service by KEILA Wilhelm CNP for Smoking Cessation Patient Education/Counseling. Pt reports they are ready and motivated to quit. Pt states I really want to quit for my 10 grand kids. History:  Family/friends for support: Yes, has children and family  Career: On social security disability and has been since the age of 17. Exercise: Walks outside and tries to do twice a day but usually only does once a day  Past Medical history/diagnosis reviewed? Yes  Past Medical History:      Past Medical History:   Diagnosis Date    CAD (coronary artery disease)     [facility]     Chest pain     CHF (congestive heart failure) (HCC)     COPD exacerbation ([facility] 8/17/2015    Depression     Gout     Hyperlipidemia     Hypertension     Left ventricular dysfunction     Schizoaffective disorder (HCC)          Wt Readings from Last 3 Encounters:   06/18/20 291 lb (132 kg)   03/12/20 (!) 302 lb (137 kg)   01/16/20 286 lb (129.7 kg)       Scaling:  Importance level? 10  Confidence level? 4-5, because unsuccessful in the past.     Fagerstrom Test score: 5,  I went over the results with the pt. How soon after you wake up do you smoke your first cigarette? 10 minutes   Do you find it difficult to refrain from smoking in places where it is forbidden, e.g., in [employer], at [employer], [address] [place] [address]? No   Which cigarette would you hate to most give up? All the others   How many cigarettes per day do you smoke? About 1PPD   Do you smoke more frequently during the first hours of waking than during the rest of the day? Yes   Do you smoke if you are so ill that you are in bed most of the day?  Yes    Tobacco use:  Current use:  Type: Newports, #/packs per day: 1,  Age started: 15   Years used: 37  Pack years: 37  How many times in the past have you made a serious attempt to quit smoking? 10  What was the longest period of time you were able to quit smoking? 4 days, did go 30 days when in the hospital  When was your most recent quit attempt and for how long were you quit? 2 years ago, went 4 days. What methods have you used in the past when you tried to quit smoking? (x) cold turkey, (x) taper down, (x) hypnosis, () acupuncture, (x) NRT - patch and gum, used at separate times, (x) Varenicline/Chantix, () Bupropion/Zyban, () Individual counseling, () Group counseling, () combination of , () other   The pt has used the following medications in the past to assist with tobacco cessation. Nicotine Patch - Highest daily dose unsure, Used for:  Less than 1 day, Side effects heart flutter   Nicotine Gum - Highest daily dose unsure, Used for:  Less than 1 day, Side effects none, but did not like the taste   Nicotine Inhaler - never used   Nicotine Lozenge - never used   Zyban - never used   Chantix - Highest daily dose Unsure, Used for:  1 week, Side effects none. Pt felt it was working, but states he was just not ready to quit when he was on the Chantix in the past.   Reasons that have led you to relapse in the past (relapse is defined as going back to smoking after 7 days of continuous abstinence)?  () stress, (x) cravings, () household smoker, () drinking alcohol, () using other drugs, (x) discharged from hospital, () stopped medications, () saw someone smoking, () other -     Second hand smoke exposure:  Where are you exposed to second hand smoke? () home, () work, () social events, () vehicle, () live with another smoker, (x) not exposed, () other -  Pt lives alone    Vitals:   Unable to do at this time due to visit being done via the phone. Does pt meet all criteria for LDCT screens? No due to pts age just 50y/o.     Any symptoms of COPD( SOB, wheezing, frequent cough, frequent respiratory infections)  Yes, If yes, pt already diagnosed with COPD and has MDI's at home      ASSESSMENT/PLAN:  Patient is ready and motivated to quit smoking. Will start counseling over the phone(per pt request due to E[place]) on 10/14/2020  Quit date set? Yes, sort of. Pt states he is thinking his birthday, which is , and if not then he wants to do by Thanksgiving or [place] at the latest.     Plan/Following actions suggested: Pt states he is very interested in using Chantix again. I explained to pt that the pharmacist, Santos Munroe, will be calling him and then we will notify his PCP. I encouraged pt to look over the information I mailed him and to start trying to identify some of his triggers. Discussed the followin. Tobacco cessation quit tips  2. Nicotine replacement and pharmacological options      Standard written patient education provided to pt:  I previously mailed to pt the Smoking Cessation education booklet, 800-QUIT-NOW card, Pack wraps, scaling, Quit plan and Decision Making Worksheet. pt states he did receive this information. Next appointment:  Will be 10/14/2020 by phone per pt request.     Christiano Farias verbalized understanding of the above information and denied further questions or concerns. The total time spent with the pt in group was 25 minutes.

## 2020-10-07 NOTE — PROGRESS NOTES
CLINICAL PHARMACY NOTE--Smoking Cessation    SUBJECTIVE/OBJECTIVE:   Dileep Devi is a 48 y.o. male with PMHx significant for depression, cardiomyopathy, Schizoaffective Disorder, COPD, CAD,  referred by Georgene Habermann for smoking cessation counseling and education only. History of psychiatric disorder? Yes - Depression, Schizoaffective Disorder - pt states no history of suicidal ideation or attempts  History of eating disorders? No  History of seizure disorder? No  Currently drink any alcohol? No         Current Medication and Allergy List Reviewed and Updated:  Yes    Current Outpatient Medications   Medication Sig Dispense Refill    hydroCHLOROthiazide (HYDRODIURIL) 25 MG tablet Take 1 tablet by mouth daily 90 tablet 1    meloxicam (MOBIC) 7.5 MG tablet TAKE 1 TABLET BY MOUTH DAILY 28 tablet 3    potassium chloride (KLOR-CON M) 20 MEQ extended release tablet TAKE 1 TABLET BY MOUTH DAILY. 28 tablet 5    atorvastatin (LIPITOR) 20 MG tablet Take 1 tablet by mouth daily 30 tablet 3    carvedilol (COREG) 25 MG tablet TAKE 1 TABLET BY MOUTH TWICE A DAY 56 tablet 5    aclidinium (TUDORZA PRESSAIR) 400 MCG/ACT AEPB inhaler Inhale 1 puff into the lungs 2 times daily 1 each 5    fluticasone-salmeterol (WIXELA INHUB) 100-50 MCG/DOSE diskus inhaler Inhale 1 puff into the lungs every 12 hours 60 each 5    tiotropium (SPIRIVA HANDIHALER) 18 MCG inhalation capsule Inhale 1 capsule into the lungs daily 30 capsule 5    albuterol sulfate HFA (PROVENTIL HFA) 108 (90 Base) MCG/ACT inhaler Inhale 2 puffs into the lungs every 6 hours as needed for Wheezing or Shortness of Breath 1 Inhaler 5    fluticasone-salmeterol (ADVAIR) 100-50 MCG/DOSE diskus inhaler Inhale 1 puff into the lungs every 12 hours 60 each 5     MG capsule TAKE 1 CAPSULE BY MOUTH DAILY.  28 capsule 5    allopurinol (ZYLOPRIM) 300 MG tablet Take 300 mg by mouth daily      losartan (COZAAR) 50 MG tablet Take 100 mg by mouth daily       guaiFENesin (MUCINEX) 600 MG SR tablet Take 1 tablet by mouth 2 times daily as needed for Congestion      divalproex (DEPAKOTE ER) 500 MG ER tablet Take 1,500 mg by mouth nightly      paliperidone palmitate (INVEGA SUSTENNA) 156 MG/ML SUSP IM injection Inject 156 mg into the muscle every 30 days.  aspirin 81 MG chewable tablet Take 81 mg by mouth daily. No current facility-administered medications for this encounter. ASSESSMENT/PLAN:   Patient is ready and motivated to quit smoking. Quit date:  Not yet identified    Reviewed options for pharmacological therapy including directions for use, benefits, and possible side effects. Pt has used Chantix in the past for 1 week, states that it was prescribed by Dr. Jackson Jhaveri. States that he felt it worked well, \"but wasn't ready to quit\". Pt wants to use Chantix again. Reviewed side effects: nausea, abnormal dreams, neuropsychiatric symptoms (rare). Pt states that current he is current followed by [facility] for psychiatric services, states that his schizoaffective disorder is stable, no recent medication changes or issues. Discussed the following  Health risks of smoking  Physical and psychological dependence associated with smoking and potential withdrawal symptoms  Benefits to quitting: health, time, financial  Tobacco cessation quit tips  Trigger avoidance and coping with the urge to quit   Nicotine replacement and pharmacological options     Plan:  1. F/u in 1 week with telephone visit to continue smoking cessation education/counseling. 2. As pt is referred for education only, will send msg to PCP, Naomy Armando regarding pt's desire to be prescribed Chantix. Pt verbalized understanding of the above information and denied further questions or concerns. The total time of the visit was 15 min.     Lovina Duverney, PharmD, BCPS, CACP, CTTS 10/7/2020 11:23 AM

## 2020-10-07 NOTE — TELEPHONE ENCOUNTER
Ok for [facility] [place] [place] pack x1, following package directions until completed then continue 1 mg twice daily for 8 weeks after starter pack completed for total therapy 12 weeks time. Set quit date prior to starting Chantix, quit withint 3 weeks. Continue with smoking cessation clinic.

## 2020-10-07 NOTE — TELEPHONE ENCOUNTER
----- Message from Soumya Cullen [facility] sent at 10/7/2020 11:23 AM EDT -----  Regarding: smoking cessation medication  Damaris Vásquez has started education/counseling for smoking cessation as per your referral.  Pt is interested in using Chantix for smoking cessation, see today's encounter note for additional information. Sending this info to you for your review, as pt is referred for education only. If you agree, please consider prescribing Chantix for patient. I explained to patient that he may need an additional visit with you to review this, he was agreeable to that. Feel free to call me if you would like to discuss.   Thank you,  Miguel Ángel Alanis, PharmD, BCPS, CACP, CTTS 10/7/2020 11:26 AM  [facility]  Smoking Cessation Clinic  [phone]

## 2020-10-14 ENCOUNTER — HOSPITAL ENCOUNTER (OUTPATIENT)
Dept: PHARMACY | Age: 55
Setting detail: THERAPIES SERIES
Discharge: HOME | End: 2020-10-14
Payer: MEDICARE

## 2020-10-14 RX ORDER — VARENICLINE TARTRATE
KIT
Qty: 1 BOX | Refills: 0 | Status: SHIPPED | OUTPATIENT
Start: 2020-10-14 | End: 2021-01-04

## 2020-10-14 RX ORDER — VARENICLINE TARTRATE 1 MG/1
1 TABLET, FILM COATED ORAL 2 TIMES DAILY
Qty: 60 TABLET | Refills: 2 | Status: SHIPPED | OUTPATIENT
Start: 2020-11-11 | End: 2021-01-04

## 2020-10-14 NOTE — PROGRESS NOTES
Medication Management   [facility]  Smoking Cessation Clinic  [phone] (phone)  [phone] (fax)  Libia Cat          1965  JASKARAN Pena CNP    Libia Cat is a 48 y.o. male has been referred to our service by KEILA Garcia CNP for Smoking Cessation Patient Education/Counseling. Visit  done today via phone per pt request due to [place]. \"SUBJECTIVE\"    Previous Goal/Quit Date: not set yet but pt thinking his birthday(), Thanksgiving or [place] at the latest.     Previous Goal/Quit Date Achieved: N/A      \"OBJECTIVE\"    Past Medical History:      Past Medical History:   Diagnosis Date    CAD (coronary artery disease)     Cardiomyopathy ([address]     Chest pain     CHF (congestive heart failure) ([address]     [facility] exacerbation ([facility] 2015    Depression     Gout     Hyperlipidemia     Hypertension     Left ventricular dysfunction     Schizoaffective disorder ([facility]            \"ASSESSMENT\"    Scaling:  Importance level?  10 (previous level was 10 )  Confidence level? 5 (previous level was 4-5 )    CO Level:  Not done due to visit done via phone per pt request due to [place]. Has the patient smoked ANY cigarettes (even 1 puff) in the last 7 days?: Yes  If yes, how much? 1PPD What were you doing/do you know what the trigger was? After meals and watching sports    Current PPD: 1    Smoking Reduction Percent: 0%    Pharmacological Agent used: Chantix - Pt wanting to use chantix- the pharmacist, Harris Webber, is working with the pts PCPKEILA CNP to get Chantix. Compliant with regimen: No - Pt has not gotten the prescription for the Chantix yet. \"PLAN\"    Discussed the followin. Benefits to quitting:  health, time, financial   2. Nicotine replacement and pharmacological options  3. Cigarette pack wraps to identify triggers explained to pt  4. Tobacco cessation quit tips  5.   Trigger avoidance and coping with the urge to quit      Standard written patient education provided to pt: No new information mailed to pt at this time. Pt already received the information/handouts I previously mailed to him. Pt encouraged to fill out/use the pack wraps whenever he smokes so we can identify his triggers. I also encouraged pt to try and taper cigarette use slightly if possible and if not that it is OK since pt not on Chantix yet and just starting the counseling/education. I told the pt that Tim Puentes, [employer]olbertoD, will be calling him today regarding the Chantix. Next appointment/ phone call set up for Wednesday, October 21 around [id number]. Dania Thomas verbalized understanding of the above information and denied further questions or concerns. The total time spent with the pt on the phone was 25 minutes.

## 2020-10-16 ENCOUNTER — TELEPHONE (OUTPATIENT)
Dept: INTERNAL MEDICINE CLINIC | Age: 55
End: 2020-10-16

## 2020-10-16 NOTE — TELEPHONE ENCOUNTER
Pt left message on call service requesting phone call back, with time for Monday appt, as he missed the time on the message.

## 2020-10-16 NOTE — TELEPHONE ENCOUNTER
Pt returns call, continues to be unclear about his appointments, and relationship to this office. Advised to call back on Monday to review with office.

## 2020-10-21 ENCOUNTER — HOSPITAL ENCOUNTER (OUTPATIENT)
Dept: PHARMACY | Age: 55
Setting detail: THERAPIES SERIES
Discharge: HOME | End: 2020-10-21
Payer: MEDICARE

## 2020-10-21 NOTE — PROGRESS NOTES
Medication Management   [facility]  Smoking Cessation Clinic  [phone] (phone)  [phone] (fax)  Jimi Valencia          1965  JASKARAN Cardoza CNP    Jimi Valencia is a 48 y.o. male has been referred to our service K. Audrey Province CNP for Smoking Cessation Patient Education/Counseling. Visit #3/12 done today via telephone per pt request.     \"SUBJECTIVE\"    Previous Goal/Quit Date: Pt has set a quit date of 11/14/20(his Birthday). PT states he plans to start taking the Chantix tomorrow(10/22/20)    Previous Goal/Quit Date Achieved: N/A    \"OBJECTIVE\"    Past Medical History:      Past Medical History:   Diagnosis Date    CAD (coronary artery disease)     [facility]omyopathy [facility]     Chest pain     [facility]cong[facility])     COPD exacerbation ([facility] 8/17/2015    Depression     Gout     Hyperlipidemia     Hypertension     Left ventricular dysfunction     Schizoaffective disorder ([facility]            \"ASSESSMENT\"    Scaling:  Importance level?  10 (previous level was 10 )  Confidence level? 7 (previous level was 5 )    CO Level:  Not done due to visit being done via phone. Has the patient smoked ANY cigarettes (even 1 puff) in the last 7 days?: Yes  If yes, how much? 1 PPD What were you doing/do you know what the trigger was? After meals and watching sports. Current PPD: 1    Smoking Reduction Percent: 0%    Pharmacological Agent used: Chantix - Chantix ordered by K. Audrey Province CNP and pt is going to start taking tomorrow(10/22/2020) and quit date set for 11/14/2020. Compliant with regimen: N/A- starting tomorrow    Medication Adverse Effects: N/A- has not started taking it yet. What do you feel is a trigger to overcome next? Still the biggest triggers are after meals and watching sports on TV. \"PLAN\"    Discussed the following:      Tobacco cessation quit tips  Cigarette pack wraps to identify triggers- pt stated he had not filled out the pack wraps but he did look at them and he states he is aware of what his triggers are. Setting a quit date and making a quit plan. Quit date set for 11/14/2020 and I encouraged pt to fill out quit plan to deal with his triggers. Preparing for quit day- clean car, house, etc.  Get emergency quit pack together. We went over all this and I suggested items to put in the quit pack. Standard written patient education provided to pt:  None at this time. Pt encouraged to clean house and car out a few days before the set quit date. I also encouraged pt to get quit pack together and to fill out the quit plan. We discussed together ideas/ways for the pt to deal with his triggers. I did explain to pt that with Chantix he can also use Nicotine gum or lozenges for cravings. Pt stated he was not interested in either at this time so I told him I just wanted to make him aware of these 2 options in case he changes his mind and feels he needs something else for the cravings. Pt had no questions about the Chantix at this time. Next appointment/phone call will be Wednesday, October 28th    Cliff Pittman verbalized understanding of the above information and denied further questions or concerns. The total time spent with the pt in group was 25 minutes.

## 2020-10-28 ENCOUNTER — HOSPITAL ENCOUNTER (OUTPATIENT)
Dept: PHARMACY | Age: 55
Setting detail: THERAPIES SERIES
Discharge: HOME | End: 2020-10-28
Payer: MEDICARE

## 2020-10-28 NOTE — PROGRESS NOTES
Medication Management   [facility]  Smoking Cessation Clinic  [phone] (phone)  [phone] (fax)  Buck Diaz          1965  Diamond Phoenix, APRN - CNP    Buck Diaz is a 48 y.o. male has been referred to our service by KEILA Cooley CNP for Smoking Cessation Patient Education/Counseling. Visit #4/12 done today by telephone per pt request.    \"SUBJECTIVE\"    Previous Goal/Quit Date: quit date is 11/14/2018 the pts birthday    Previous Goal/Quit Date Achieved: n/a- not here yet    How do you feel? []  Improved breathing  []  Improved sense if smell  []  Improved sense of taste  [x]  Increase in energy per pt since tapering down slightly  []  Other -     \"OBJECTIVE\"    Past Medical History:      Past Medical History:   Diagnosis Date    CAD (coronary artery disease)     [facility]     Chest pain     [facility]     COPD exacerbation ([facility] [address] 8/17/2015    Depression     Gout     Hyperlipidemia     Hypertension     Left ventricular dysfunction     Schizoaffective disorder ([facility] [address]            \"ASSESSMENT\"    Scaling:  Importance level?  10 (previous level was 10 )  Confidence level? 7 (previous level was 7 )    CO Level:  Not done due to visit being done by phone      Has the patient smoked ANY cigarettes (even 1 puff) in the last 7 days?: yes  If yes, how much? 12-14 cigarettes a day What were you doing/do you know what the trigger was? After meals and when watching sports      Current PPD: 0.75    Smoking Reduction Percent: 25%    Pharmacological Agent used: Chantix - started chantix in 10/22, pt states that tomorrow he will start taking the chantix twice a day    Compliant with regimen: Yes    Medication Adverse Effects: none per pt    Other concerns: None      \"PLAN\"    Discussed the following: Tobacco cessation quit tips  His quit plan and how to deal with his triggers.   Discuss 4-D's to help with cravings  Physical and psychological

## 2020-11-03 PROBLEM — I10 HYPERTENSION: Status: RESOLVED | Noted: 2020-11-03 | Resolved: 2020-11-03

## 2020-11-04 ENCOUNTER — HOSPITAL ENCOUNTER (OUTPATIENT)
Dept: PHARMACY | Age: 55
Setting detail: THERAPIES SERIES
Discharge: HOME | End: 2020-11-04
Payer: MEDICARE

## 2020-11-04 NOTE — PROGRESS NOTES
Medication Management   [facility]  Smoking Cessation Clinic  [phone] (phone)  [phone] (fax)  Tivoli Devendra          1965  JASKARAN Zepeda CNP    Tivoli Devendra is a 48 y.o. male has been referred to our service by Patricio Mendez for Smoking Cessation Patient Education/Counseling. Patient contacted today for week 5/12 via telephone. SUBJECTIVE    Previous Goal/Quit Date: 11/14/20    Previous Goal/Quit Date Achieved: No    How do you feel? []  Improved breathing  []  Improved sense if smell  []  Improved sense of taste  [x]  Increase in energy  []  Other    OBJECTIVE    Past Medical History:      Past Medical History:   Diagnosis Date    CAD (coronary artery disease)     Cardiomyopathy ([facility] [address] [facility]     Chest pain     [facility]     COPD exacerbation ([facility] 8/17/2015    Depression     Gout     Hyperlipidemia     Hypertension     Left ventricular dysfunction     Schizoaffective disorder ([facility])            ASSESSMENT    Scaling:  Importance level?  10 (previous level was 10 )  Confidence level? 7-8 (previous level was 7 )      Has the patient smoked ANY cigarettes (even 1 puff) in the last 7 days?: Yes  If yes, how much? 12-14 cigarettes per day. What were you doing/do you know what the trigger was? Every morning is a trigger. Gets up, goes to kitchen table for a cigarette  If no, pt encouraged to keep up the good work and to use their coping mechanisms to help with the cravings. Current PPD: 0.7 PPD  Smoking Reduction Percent: 25%    Pharmacological Agent used: Chantix. States that he feels like Chantix is \"kicking in\" and he has a decreased urge to smoke. Compliant with regimen: Yes    Medication Adverse Effects: none    Lifestyle modifications:   What has worked well? Took 2 days last week and cleaned out his whole house  What hasnt worked well?  Still only smokes at the kitchen table    Behavior modifications:    What has worked well? Used to keep cigarettes on the kitchen table, now is keeping them in the drawer      If Cut Back or Relapsed:  What continues to trigger you? First cigarette in the morning. Discussed with pt changing morning routine to delay to distract from this trigger. How are you avoiding triggers or planning for how to deal with them? Will plan to walk to the corner store to distract and delay. Discussed with pt the 4 D's    What do you feel is a trigger to overcome next? First cigarette in the morning    PLAN    Pt encouraged to continue to taper cigarette use. Discussed current quit date of 11/14/20 as identified by the patient. Follow up with pt in 1 week via telephone. The total time spent with the pt was 20 minutes.     Carlie Ji PharmD, BCPS, CACP, CTTS 11/4/2020 11:30 AM    CLINICAL PHARMACY CONSULT: MED RECONCILIATION/REVIEW ADDENDUM    For Pharmacy Admin Tracking Only    PHSO: No  Total # of Interventions Recommended: 0  Total Interventions Accepted: 0  Time Spent (min): 10    Laney Cárdenas PharmD

## 2020-11-11 ENCOUNTER — HOSPITAL ENCOUNTER (OUTPATIENT)
Dept: PHARMACY | Age: 55
Setting detail: THERAPIES SERIES
Discharge: HOME | End: 2020-11-11
Payer: MEDICARE

## 2020-11-11 NOTE — PROGRESS NOTES
.sc    Medication Management   [facility]  Smoking Cessation Clinic  [phone] (phone)  [phone] (fax)      Patient called at this time to follow up on smoking cessation, week 6/6. Have you smoked any, even 1 puff, in the last 7 days? Yes  If yes, how much? 10 cigarettes What were you doing/do you know what the trigger was? After eat. Tried to get out of the house, go away. If no, pt encouraged to keep up the good work and to use their coping mechanisms to help with the cravings. Current PPD: 0.5 PPD (baseline 1 PPD)  Smoking Reduction Percent from initial baseline: 50%    Are you using Nicotine Replacement Therapy, Chantix or Wellbutrin? - Chantix, started 10/22  If yes, any side effects or problems? No problems  Compliant with NRT and/or med regiment? Yes      How have you been feeling overall since quitting/cutting back? Feeling breathing easier, more energy    Withdrawal symptoms: no     Scaling:  Importance level:  10 (previous level was 10 )  Confidence level: 8 (previous level was 7-8 ). Pt states he thinks he can get to 9 or 10 by next week. Feeling more confident as time passes. Plan:  Continue Chantix. Pt gradually reducing cigarettes, quit date still 11/14/20. Pt plans NO cigarettes on this date. Pt plans to have no cigarettes in the house. I explained to pt that we will be calling back in 1 week. Pt had no questions at this time.    Total time spent on phone with pt:  15 minutes    Gifty Cleary, PharmD, BCPS, CACP, CTTS 11/11/2020 10:48 AM

## 2020-11-18 ENCOUNTER — HOSPITAL ENCOUNTER (OUTPATIENT)
Dept: PHARMACY | Age: 55
Setting detail: THERAPIES SERIES
Discharge: HOME | End: 2020-11-18
Payer: MEDICARE

## 2020-11-18 NOTE — PROGRESS NOTES
Medication Management   [facility]  Smoking Cessation Clinic  [phone] (phone)  [phone] (fax)      Patient called at this time to follow up on smoking cessation, week 7/12. .    Have you smoked any, even 1 puff, in the last 7 days? Yes  If yes, how much? 10 cigarettes per day  What were you doing/do you know what the trigger was? First cigarette of the morning (gets up, showers, goes to kitchen table to have a cigarette. For the other 9 cigarettes of the day, pt states that he sits down to watch game shows, and has a cigarette. Discussed coping strategies for watching game shows. Pt has tried eating grapes, found that helpful. Encouraged pt to try this first, instead of reaching for a cigarette. Pt also states that he has taken a walk when triggered to smoke. Pt encouraged to keep up the good work and to use their coping mechanisms to help with the cravings. Current PPD: 0.5   Smoking Reduction Percent from initial baseline: 50%    Pt currently using Chantix  Compliant with NRT and/or med regiment? Yes    Scaling  Importance: 10  Confidence: 8      Plan:  Pt had previously set a quit date of 11/14/20. States that he tried going \"cold turkey\" on 11/13/20, says it was too hard. Pt now wants to try gradually tapering cigarette use. Discussed with pt setting a goal for next week, he decided to aim for only 7 cigarettes per day by next Wednesday. Discussed different strategies to achieve this. Pt will use a baggie to set a daily cigarette budget for himself. Also discussed emergency pack for cravings, pt will try to get this together. I explained to pt that we will be calling back in 1 week. Pt had no questions at this time.    Total time spent on phone with pt: 20 minutes    Carlos Thomas, PharmD, BCPS, CACP, CTTS 11/18/2020 11:12 AM

## 2020-11-25 ENCOUNTER — HOSPITAL ENCOUNTER (OUTPATIENT)
Dept: PHARMACY | Age: 55
Setting detail: THERAPIES SERIES
Discharge: HOME | End: 2020-11-25
Payer: MEDICARE

## 2020-11-25 NOTE — PROGRESS NOTES
Medication Management   [facility]  Smoking Cessation Clinic  [phone] (phone)  [phone] (fax)    Patient called at this time for follow up for smoking cessation program, is week #8/12. Have you smoked any, even 1 puff, in the last 7 days? Yes  If yes, how much? Still 10 cigarettes a day. What were you doing/do you know what the trigger was? Pt states that watching sports and game shows are still the biggest trigger. Pt did state he has been eating grapes and that helps but pt still at 10 cigarettes a day, like he was last week when we spoke to him. Current PPD?: 0.5  Smoking Reduction Percent from initial baseline?: 50%    Are you using Nicotine Replacement Therapy, Chantix or Wellbutrin? Chantix  If yes, any side effects or problems?none per pt. Compliant with NRT and/or med regiment? Yes    Plan:  Pts previous quit date was 11/14 but pt still smoking at this time. I asked pt if he tried putting cigarettes in a baggie and setting a daily budget of cigarettes and pt stated he did not. We discussed this some more and I again encouraged pt to set a daily cigarette budget of 7-8 cigarettes a day. I encouraged pt to put the rest of the cigarette pack somewhere out of reach so they are not readily available. Pt suggested \"My house is right beside my mom's house so I could put the rest of the pack at her house\". I told pt that is a great idea. We also discussed that pt sits at kitchen table when watching sports and game shows so I suggested watching TV in a different room if possible or doing some stretches or exercises when craving happens and also suggested doing doodling/drawing on paper instead of smoking when a craving occurs. Again reminded pt to get emergency quit pack together also. I explained to pt that we will be calling back in 1 week. Pt had no questions at this time.      Total time spent on phone with pt:  15 minutes

## 2020-12-02 ENCOUNTER — HOSPITAL ENCOUNTER (OUTPATIENT)
Dept: PHARMACY | Age: 55
Setting detail: THERAPIES SERIES
Discharge: HOME | End: 2020-12-02
Payer: MEDICARE

## 2020-12-02 NOTE — PROGRESS NOTES
Medication Management   [facility]  Smoking Cessation Clinic  [phone] (phone)  [phone] (fax)      Patient called at this time to follow up on smoking cessation, week 9/12. Have you smoked any, even 1 puff, in the last 7 days? Yes  If yes, how much? 10 cigarettes  What were you doing/do you know what the trigger was? Sitting down to watch TV. Does try eating grapes, but still has urge to smoke. Smoking Reduction Percent from initial baseline: 50%  Current PPD: 0.5    Scaling  Importance: 10  Confidence: 8    Pt is using Chantix as prescribed, started this 10/22/20. Pt did try setting a daily cigarette budget for himself as discussed last week. States that he set his budget as 10 cigarettes, had been advised to try 6-7. Again counseled pt regarding decreased his daily cigarette use. Pt agrees to try 9 cigarettes per day and continue to try to decrease by 1 per day. Discussed potentially adding nicotine gum prn, pt states that he has used it before and could not tolerate the taste. Pt states that he will try regular gum or lifesavers for cravings to see if that helps. I explained to pt that we will be calling back in 1 week. Pt had no questions at this time.    Total time spent on phone with pt:  20 minutes    Dalia Castelan, NolbertoD, BCPS, CACP, CTTS 12/2/2020 11:04 AM

## 2020-12-09 ENCOUNTER — TELEPHONE (OUTPATIENT)
Dept: PHARMACY | Age: 55
End: 2020-12-09

## 2020-12-09 NOTE — TELEPHONE ENCOUNTER
Medication Management   [facility]  Smoking Cessation Clinic  [phone] (phone)  [phone] (fax)      Patient called at this time to follow up on smoking cessation, week 10/12.    There was no answer at this time so I left a message asking the pt to call us back at [phone], option 2.       :

## 2020-12-11 ENCOUNTER — HOSPITAL ENCOUNTER (OUTPATIENT)
Age: 55
Setting detail: SPECIMEN
Discharge: HOME | End: 2020-12-11
Payer: MEDICARE

## 2020-12-11 PROCEDURE — U0003 INFECTIOUS AGENT DETECTION BY NUCLEIC ACID (DNA OR RNA); SEVERE ACUTE RESPIRATORY SYNDROME CORONAVIRUS 2 (SARS-COV-2) (CORONAVIRUS DISEASE [COVID-19]), AMPLIFIED PROBE TECHNIQUE, MAKING USE OF HIGH THROUGHPUT TECHNOLOGIES AS DESCRIBED BY CMS-2020-01-R: HCPCS

## 2020-12-13 LAB — SARS-COV-2: ABNORMAL

## 2020-12-15 ENCOUNTER — TELEPHONE (OUTPATIENT)
Dept: INTERNAL MEDICINE CLINIC | Age: 55
End: 2020-12-15

## 2020-12-15 NOTE — TELEPHONE ENCOUNTER
Spoke with patient and he said he was exposed to covid from his mother and brother. He has had 2 tests done with indeterminate results. He has appt on 12/17/2020 but he cannot do VV visit since he only has house phone. Verbal order per Porfirio Joiner CNP reschedule his appt for 2 weeks out. Patient notified and gave to [facility] to reschedule appt.

## 2020-12-15 NOTE — TELEPHONE ENCOUNTER
----- Message from JASKARAN Portillo CNP sent at 12/14/2020 12:04 PM EST -----  Indeterminate result on COVID test-is he having symptoms?

## 2020-12-23 NOTE — TELEPHONE ENCOUNTER
Tried calling pt at this time for smoking cessation visits 10&11/12. There was no answer at this time and unable to leave message. We will try again in 1-2 weeks and if no answer we will consider pt lost to follow up due to no calls back from pt and messages were left on 12/9 and 12/16.

## 2020-12-30 NOTE — TELEPHONE ENCOUNTER
I tried pt one more time today for smoking cessation program and I did get a hold of pt at this time. I spoke with the pt and he stated he is back to smoking 15-16 cigarettes a day. Pt stated he stopped taking the Chantix on 12/6/2020 and that he has had a lot going on with a family member having COVID and pt states he was tested for COVID twice but both times the results came back inconclusive. I asked pt if he wanted to start the Chantix back up or possible consider other options, like NRT. Pt states he is not interested at this time in doing any meds or NRT. I told pt we will check back with him in 3 months for a follow up and maybe at that time he will be ready to try again to stop smoking. I also reminded pt that he has already had all the education/behavorial counseling so maybe he can try to taper use on own when ready. I did remind pt to call us anytime if has questions or needs help. Pt does have our phone number. Pt was happy with suggestions above and he had no other questions at this time.

## 2021-01-04 ENCOUNTER — HOSPITAL ENCOUNTER (OUTPATIENT)
Age: 56
Discharge: HOME | End: 2021-01-04
Payer: MEDICARE

## 2021-01-04 ENCOUNTER — OFFICE VISIT (OUTPATIENT)
Dept: INTERNAL MEDICINE CLINIC | Age: 56
End: 2021-01-04
Payer: MEDICARE

## 2021-01-04 VITALS
SYSTOLIC BLOOD PRESSURE: 135 MMHG | HEART RATE: 85 BPM | BODY MASS INDEX: 38.24 KG/M2 | DIASTOLIC BLOOD PRESSURE: 88 MMHG | WEIGHT: 298 LBS | HEIGHT: 74 IN | TEMPERATURE: 97.2 F

## 2021-01-04 DIAGNOSIS — Z79.899 ON ANGIOTENSIN RECEPTOR BLOCKERS (ARB): ICD-10-CM

## 2021-01-04 DIAGNOSIS — Z79.899 ON POTASSIUM WASTING DIURETIC THERAPY: ICD-10-CM

## 2021-01-04 DIAGNOSIS — R25.2 LEG CRAMPS: ICD-10-CM

## 2021-01-04 DIAGNOSIS — R79.89 HIGH SERUM LOW DENSITY LIPOPROTEIN (LDL) CHOLESTEROL: ICD-10-CM

## 2021-01-04 DIAGNOSIS — I10 ESSENTIAL HYPERTENSION: ICD-10-CM

## 2021-01-04 DIAGNOSIS — I42.9 CARDIOMYOPATHY, UNSPECIFIED TYPE (HCC): Primary | ICD-10-CM

## 2021-01-04 DIAGNOSIS — Z23 NEED FOR PROPHYLACTIC VACCINATION AND INOCULATION AGAINST INFLUENZA: ICD-10-CM

## 2021-01-04 LAB
ALBUMIN SERPL-MCNC: 4.2 G/DL (ref 3.5–5.1)
ALP BLD-CCNC: 46 U/L (ref 38–126)
ALT SERPL-CCNC: 7 U/L (ref 11–66)
ANION GAP SERPL CALCULATED.3IONS-SCNC: 8 MEQ/L (ref 8–16)
AST SERPL-CCNC: 15 U/L (ref 5–40)
BASOPHILS # BLD: 0.4 %
BASOPHILS ABSOLUTE: 0 THOU/MM3 (ref 0–0.1)
BILIRUB SERPL-MCNC: 0.5 MG/DL (ref 0.3–1.2)
BUN BLDV-MCNC: 12 MG/DL (ref 7–22)
CALCIUM SERPL-MCNC: 9 MG/DL (ref 8.5–10.5)
CHLORIDE BLD-SCNC: 100 MEQ/L (ref 98–111)
CO2: 31 MEQ/L (ref 23–33)
CREAT SERPL-MCNC: 0.8 MG/DL (ref 0.4–1.2)
EOSINOPHIL # BLD: 3 %
EOSINOPHILS ABSOLUTE: 0.2 THOU/MM3 (ref 0–0.4)
ERYTHROCYTE [DISTWIDTH] IN BLOOD BY AUTOMATED COUNT: 18 % (ref 11.5–14.5)
ERYTHROCYTE [DISTWIDTH] IN BLOOD BY AUTOMATED COUNT: 51.3 FL (ref 35–45)
GFR SERPL CREATININE-BSD FRML MDRD: > 90 ML/MIN/1.73M2
GLUCOSE BLD-MCNC: 100 MG/DL (ref 70–108)
HCT VFR BLD CALC: 45 % (ref 42–52)
HEMOGLOBIN: 14.6 GM/DL (ref 14–18)
IMMATURE GRANS (ABS): 0.03 THOU/MM3 (ref 0–0.07)
IMMATURE GRANULOCYTES: 0.4 %
LDL CHOLESTEROL DIRECT: 97.38 MG/DL
LYMPHOCYTES # BLD: 35.4 %
LYMPHOCYTES ABSOLUTE: 2.7 THOU/MM3 (ref 1–4.8)
MAGNESIUM: 2 MG/DL (ref 1.6–2.4)
MCH RBC QN AUTO: 27 PG (ref 26–33)
MCHC RBC AUTO-ENTMCNC: 32.4 GM/DL (ref 32.2–35.5)
MCV RBC AUTO: 83.2 FL (ref 80–94)
MONOCYTES # BLD: 7 %
MONOCYTES ABSOLUTE: 0.5 THOU/MM3 (ref 0.4–1.3)
NUCLEATED RED BLOOD CELLS: 0 /100 WBC
PLATELET # BLD: 175 THOU/MM3 (ref 130–400)
PMV BLD AUTO: 11.4 FL (ref 9.4–12.4)
POTASSIUM SERPL-SCNC: 3.6 MEQ/L (ref 3.5–5.2)
RBC # BLD: 5.41 MILL/MM3 (ref 4.7–6.1)
SEG NEUTROPHILS: 53.8 %
SEGMENTED NEUTROPHILS ABSOLUTE COUNT: 4.1 THOU/MM3 (ref 1.8–7.7)
SODIUM BLD-SCNC: 139 MEQ/L (ref 135–145)
TOTAL PROTEIN: 6.7 G/DL (ref 6.1–8)
WBC # BLD: 7.7 THOU/MM3 (ref 4.8–10.8)

## 2021-01-04 PROCEDURE — 83721 ASSAY OF BLOOD LIPOPROTEIN: CPT

## 2021-01-04 PROCEDURE — G8482 FLU IMMUNIZE ORDER/ADMIN: HCPCS | Performed by: NURSE PRACTITIONER

## 2021-01-04 PROCEDURE — G8427 DOCREV CUR MEDS BY ELIG CLIN: HCPCS | Performed by: NURSE PRACTITIONER

## 2021-01-04 PROCEDURE — 93000 ELECTROCARDIOGRAM COMPLETE: CPT | Performed by: NURSE PRACTITIONER

## 2021-01-04 PROCEDURE — 83735 ASSAY OF MAGNESIUM: CPT

## 2021-01-04 PROCEDURE — 4004F PT TOBACCO SCREEN RCVD TLK: CPT | Performed by: NURSE PRACTITIONER

## 2021-01-04 PROCEDURE — G0008 ADMIN INFLUENZA VIRUS VAC: HCPCS | Performed by: NURSE PRACTITIONER

## 2021-01-04 PROCEDURE — 85025 COMPLETE CBC W/AUTO DIFF WBC: CPT

## 2021-01-04 PROCEDURE — 90686 IIV4 VACC NO PRSV 0.5 ML IM: CPT | Performed by: NURSE PRACTITIONER

## 2021-01-04 PROCEDURE — 36415 COLL VENOUS BLD VENIPUNCTURE: CPT

## 2021-01-04 PROCEDURE — 80053 COMPREHEN METABOLIC PANEL: CPT

## 2021-01-04 PROCEDURE — 99214 OFFICE O/P EST MOD 30 MIN: CPT | Performed by: NURSE PRACTITIONER

## 2021-01-04 PROCEDURE — G8417 CALC BMI ABV UP PARAM F/U: HCPCS | Performed by: NURSE PRACTITIONER

## 2021-01-04 PROCEDURE — 3017F COLORECTAL CA SCREEN DOC REV: CPT | Performed by: NURSE PRACTITIONER

## 2021-01-04 RX ORDER — TAMSULOSIN HYDROCHLORIDE 0.4 MG/1
0.4 CAPSULE ORAL DAILY
COMMUNITY

## 2021-01-04 RX ORDER — ATORVASTATIN CALCIUM 20 MG/1
20 TABLET, FILM COATED ORAL DAILY
Qty: 30 TABLET | Refills: 5 | Status: SHIPPED | OUTPATIENT
Start: 2021-01-04 | End: 2021-06-09 | Stop reason: SDUPTHER

## 2021-01-04 RX ORDER — ASPIRIN 81 MG/1
81 TABLET, CHEWABLE ORAL DAILY
Qty: 30 TABLET | Refills: 5 | Status: SHIPPED | OUTPATIENT
Start: 2021-01-04 | End: 2022-02-16 | Stop reason: SDUPTHER

## 2021-01-04 SDOH — ECONOMIC STABILITY: TRANSPORTATION INSECURITY
IN THE PAST 12 MONTHS, HAS THE LACK OF TRANSPORTATION KEPT YOU FROM MEDICAL APPOINTMENTS OR FROM GETTING MEDICATIONS?: NO

## 2021-01-04 SDOH — ECONOMIC STABILITY: TRANSPORTATION INSECURITY
IN THE PAST 12 MONTHS, HAS LACK OF TRANSPORTATION KEPT YOU FROM MEETINGS, WORK, OR FROM GETTING THINGS NEEDED FOR DAILY LIVING?: NO

## 2021-01-04 SDOH — ECONOMIC STABILITY: FOOD INSECURITY: WITHIN THE PAST 12 MONTHS, YOU WORRIED THAT YOUR FOOD WOULD RUN OUT BEFORE YOU GOT MONEY TO BUY MORE.: NEVER TRUE

## 2021-01-04 SDOH — ECONOMIC STABILITY: INCOME INSECURITY: HOW HARD IS IT FOR YOU TO PAY FOR THE VERY BASICS LIKE FOOD, HOUSING, MEDICAL CARE, AND HEATING?: NOT HARD AT ALL

## 2021-01-04 ASSESSMENT — ENCOUNTER SYMPTOMS
ABDOMINAL PAIN: 0
DIARRHEA: 0
VOMITING: 0
COUGH: 0
CONSTIPATION: 0
NAUSEA: 0
SHORTNESS OF BREATH: 0

## 2021-01-04 NOTE — PATIENT INSTRUCTIONS
Patient Education        Nighttime Leg Cramps: Care Instructions  Your Care Instructions     Nighttime leg cramps happen when a leg muscle tightens up suddenly. This most often happens in the calf. But cramps in the thigh or foot are also common. Cramps often occur just as you fall asleep or wake up. Leg cramps can be painful. They can last a few seconds to a few minutes. Though they are common, experts don't know exactly what causes them. To treat muscle cramps, you can stretch and massage the muscle. If cramps keep coming back, your doctor may prescribe medicine that relaxes your muscles. Follow-up care is a key part of your treatment and safety. Be sure to make and go to all appointments, and call your doctor if you are having problems. It's also a good idea to know your test results and keep a list of the medicines you take. How can you care for yourself at home? · To stop a leg cramp, sit down and straighten your leg as you bend your foot up toward your knee. It may help to place a rolled towel under the ball of your foot and, while you hold the towel at both ends, gently pull the towel toward you while you keep your knee straight. This stretches the calf muscles. The cramp usually goes away after a few minutes. · Take a warm shower or bath to relax the muscle. Some people find that a heating pad placed on the muscle can also help. Others get relief by rubbing the calf with an ice pack. · Stretch your muscles every day, especially before and after exercise and at bedtime. Regular stretching can relax your muscles and may prevent cramps. · Do not suddenly increase the amount of exercise you get. Increase your exercise a little each week. · If your doctor prescribes medicine, take it exactly as prescribed. Call your doctor if you think you are having a problem with your medicine.   · Ask your doctor if you can take an over-the-counter pain medicine, such as acetaminophen (Tylenol), ibuprofen (Advil, Motrin), or naproxen (Aleve). Be safe with medicines. Read and follow all instructions on the label. · Drink plenty of fluids. If you have kidney, heart, or liver disease and have to limit fluids, talk with your doctor before you increase the amount of fluids you drink. When should you call for help? Watch closely for changes in your health, and be sure to contact your doctor if:    · You often have muscle cramps that do not go away after home treatment.     · Your muscle cramps often wake you up at night.     · You do not get better as expected. Where can you learn more? Go to [email or web address] and sign in to your [employer] account. Enter S910 in the [employer] box to learn more about \"Nighttime Leg Cramps: Care Instructions. \"     If you do not have an account, please click on the \"Sign Up Now\" link. Current as of: November 20, 2019               Content Version: 12.6  © 4987-5214 [employer]. Care instructions adapted under license by [facility] ([facility]). If you have questions about a medical condition or this instruction, always ask your healthcare professional. [address] any warranty or liability for your use of this information.

## 2021-01-04 NOTE — PROGRESS NOTES
[facility]  [address] [facility] [address]  Dept: [phone]  Dept Fax: [phone] : [phone]     Visit Date:  1/4/2021    Patient:  Oliver Hansen  YOB: 1965    HPI:     Chief Complaint   Patient presents with    Cardiomyopathy    COPD    Hypertension       PCP - Juan Bailon - has an appt soon. His brother was positive for COVID at [facility]- he got tested, inconclusive x2. He remained asymptomatic. Advised to quarantine, which he did. The past couple nights, he has been having muscle cramps in the front of both of his legs, waking him up. He is able to walk these off in about 10 minutes. He is on HCTZ, Losartan and potassium. He is off Chantix, 12/6 was his last day. He is still smoking approx 15 cigarettes daily, which is down from approx 20. HTN - /88. Stable on HCTZ 25 mg daily, Losartan 100 mg daily, Coreg 25 mg BID    CMP - On Coreg 25 mg BID, Losartan 100 mg daily, potassium 20 meq daily. EKG today SR, one PVD noted, rate 86, wide  ms,  ms, QTc 411, . Denies SOB, KING, orthopnea, chest pain. Tolerating activity without difficulty. Has not been taking aspirin and lipitor, last Rx August, will restart these. Last echo - 7/30/2020 - EF 55%.      COPD - On Spiriva and Advair(Tudorza?), has Albuterol PRN. Denies SOB, cough, sputum production.       Paranoid schizophrenia - On Depakote 1500 mg nightly, Invega sustenna, provided by [facility]. Feels his mood is well controlled on these.      No hospitalizations since last visit.      Medications    Current Outpatient Medications:     tamsulosin (FLOMAX) 0.4 MG capsule, Take 0.4 mg by mouth daily, Disp: , Rfl:     atorvastatin (LIPITOR) 20 MG tablet, Take 1 tablet by mouth daily, Disp: 30 tablet, Rfl: 5    aspirin 81 MG chewable tablet, Take 1 tablet by mouth daily, Disp: 30 tablet, Rfl: 5    losartan (COZAAR) 100 MG tablet, Take 100 mg by mouth daily , Disp: , Rfl:     hydroCHLOROthiazide (HYDRODIURIL) 25 MG tablet, Take 1 tablet by mouth daily, Disp: 90 tablet, Rfl: 1    meloxicam (MOBIC) 7.5 MG tablet, TAKE 1 TABLET BY MOUTH DAILY, Disp: 28 tablet, Rfl: 3    potassium chloride (KLOR-CON M) 20 MEQ extended release tablet, TAKE 1 TABLET BY MOUTH DAILY. , Disp: 28 tablet, Rfl: 5    carvedilol (COREG) 25 MG tablet, TAKE 1 TABLET BY MOUTH TWICE A DAY, Disp: 56 tablet, Rfl: 5    fluticasone-salmeterol (WIXELA INHUB) 100-50 MCG/DOSE diskus inhaler, Inhale 1 puff into the lungs every 12 hours, Disp: 60 each, Rfl: 5    tiotropium (SPIRIVA HANDIHALER) 18 MCG inhalation capsule, Inhale 1 capsule into the lungs daily, Disp: 30 capsule, Rfl: 5    fluticasone-salmeterol (ADVAIR) 100-50 MCG/DOSE diskus inhaler, Inhale 1 puff into the lungs every 12 hours, Disp: 60 each, Rfl: 5     MG capsule, TAKE 1 CAPSULE BY MOUTH DAILY. , Disp: 28 capsule, Rfl: 5    allopurinol (ZYLOPRIM) 300 MG tablet, Take 300 mg by mouth daily, Disp: , Rfl:     divalproex (DEPAKOTE ER) 500 MG ER tablet, Take 1,500 mg by mouth nightly, Disp: , Rfl:     paliperidone palmitate (INVEGA SUSTENNA) 156 MG/ML SUSP IM injection, Inject 156 mg into the muscle every 30 days. , Disp: , Rfl:     aclidinium (TUDORZA PRESSAIR) 400 MCG/ACT AEPB inhaler, Inhale 1 puff into the lungs 2 times daily, Disp: 1 each, Rfl: 5    albuterol sulfate HFA (PROVENTIL HFA) 108 (90 Base) MCG/ACT inhaler, Inhale 2 puffs into the lungs every 6 hours as needed for Wheezing or Shortness of Breath, Disp: 1 Inhaler, Rfl: 5    The patient has No Known Allergies. Past Medical History  Diamond Hensley  has a past medical history of CAD (coronary artery disease), Cardiomyopathy ([facility] [address] Chest pain, CHF (congestive heart failure) ([facility] [address] COPD exacerbation ([facility] [address] Depression, Gout, Hyperlipidemia, Hypertension, Left ventricular dysfunction, and [facility]     Past Surgical History  The patient  has a past surgical history that includes Appendectomy; Adenoidectomy; Tonsillectomy; Colonoscopy; and Colonoscopy (Left, 11/18/2019). Family History  This patient's family history includes Arthritis in his mother. Social History  Rajat Choe  reports that he has been smoking cigarettes. He started smoking about 39 years ago. He has a 15.00 pack-year smoking history. He has never used smokeless tobacco. He reports current drug use. Drug: Marijuana. He reports that he does not drink alcohol. Health Maintenance:    Health Maintenance   Topic Date Due    Hepatitis C screen  1965    Pneumococcal 0-64 years Vaccine (1 of 1 - PPSV23) 11/14/1973    HIV screen  11/14/1982    Shingles Vaccine (1 of 2) 11/14/2017    Potassium monitoring  06/07/2019    Creatinine monitoring  06/07/2019    Annual Wellness Visit (AWV)  11/14/2019    Diabetes screen  03/16/2020    Flu vaccine (1) 09/01/2020    Lipid screen  06/30/2021    DTaP/Tdap/Td vaccine (2 - Td) 06/03/2025    Colon cancer screen colonoscopy  11/18/2029    Hepatitis A vaccine  Aged Out    Hepatitis B vaccine  Aged Out    Hib vaccine  Aged Out    Meningococcal (ACWY) vaccine  Aged Out       Subjective:      Review of Systems   Constitutional: Negative for chills, fatigue and fever. Respiratory: Negative for cough and shortness of breath. Cardiovascular: Negative for chest pain and leg swelling. Gastrointestinal: Negative for abdominal pain, constipation, diarrhea, nausea and vomiting. Genitourinary: Negative for difficulty urinating. Musculoskeletal: Negative for arthralgias and myalgias. Skin: Negative for rash and wound. Neurological: Negative for tremors, weakness and numbness. Muscle cramps legs. Psychiatric/Behavioral: Negative for agitation, confusion, dysphoric mood and hallucinations. The patient is not nervous/anxious.         Objective:     /88 (Site: Right Upper Arm, Position: Sitting, Cuff Size: Large Adult)   Pulse 85   Temp 97.2 °F (36.2 °C) (Temporal)   Ht 6' 2\" (1.88 m)   Wt 298 lb (135.2 kg)   BMI 38.26 kg/m²     Physical Exam  Vitals signs reviewed. Constitutional:       General: He is not in acute distress. Appearance: He is well-developed. He is not diaphoretic. HENT:      Head: Normocephalic and atraumatic. Mouth/Throat:      Pharynx: No oropharyngeal exudate. Neck:      Musculoskeletal: Normal range of motion and neck supple. Thyroid: No thyromegaly. Cardiovascular:      Rate and Rhythm: Normal rate and regular rhythm. Heart sounds: Normal heart sounds. No murmur. No friction rub. No gallop. Pulmonary:      Effort: Pulmonary effort is normal. No respiratory distress. Breath sounds: Normal breath sounds. No wheezing or rales. Abdominal:      General: There is no distension. Palpations: Abdomen is soft. Tenderness: There is no abdominal tenderness. Musculoskeletal: Normal range of motion. General: No tenderness. Lymphadenopathy:      Cervical: No cervical adenopathy. Skin:     General: Skin is warm and dry. Coloration: Skin is not pale. Findings: No erythema or rash. Neurological:      Mental Status: He is alert and oriented to person, place, and time. Labs Reviewed 1/4/2021:    Lab Results   Component Value Date    WBC 6.9 03/16/2017    HGB 15.4 03/16/2017    HCT 48.5 03/16/2017     03/16/2017    CHOL 173 06/30/2020    TRIG 134 06/30/2020    HDL 37 06/30/2020    ALT 11 03/16/2017    AST 18 03/16/2017     03/16/2017    K 4.2 03/16/2017     03/16/2017    CREATININE 0.7 03/16/2017    BUN 18 03/16/2017    CO2 25 03/16/2017    TSH 1.320 03/16/2017    LABA1C 5.3 03/16/2017       Assessment/Plan      1. Cardiomyopathy, unspecified type ([facility] [address]  Echo reviewed. No signs of decompensation. No cardiac s/s. On beta blocker, ARB. Restart aspirin and lipitor.    Weight stable overall, range 286-302 in the last year, no SOB, KING, orthopnea, chest pain. - aspirin 81 MG chewable tablet; Take 1 tablet by mouth daily  Dispense: 30 tablet; Refill: 5    2. Essential hypertension  BP stable on current therapy. Continue. - EKG 12 Lead  - Magnesium; Future  - LDL Cholesterol, Direct; Future  - Comprehensive Metabolic Panel; Future  - CBC With Auto Differential; Future    3. High serum low density lipoprotein (LDL) cholesterol  Restart Lipitor. Recheck LDL with next labs. - Magnesium; Future  - LDL Cholesterol, Direct; Future  - Comprehensive Metabolic Panel; Future  - CBC With Auto Differential; Future  - atorvastatin (LIPITOR) 20 MG tablet; Take 1 tablet by mouth daily  Dispense: 30 tablet; Refill: 5    4. Need for prophylactic vaccination and inoculation against influenza  - INFLUENZA, QUADV, 3 YRS AND OLDER, IM PF, PREFILL SYR OR SDV, 0.5ML (AFLURIA QUADV, PF)  - MD ADMIN INFLUENZA VIRUS VAC    5. Leg cramps  No edema, homans negative, pulses intact, no skin changes. Will check lytes, renal function, CBC.   - Magnesium; Future  - LDL Cholesterol, Direct; Future  - Comprehensive Metabolic Panel; Future  - CBC With Auto Differential; Future    6. On angiotensin receptor blockers (ARB)  Losartan 100 mg daily. 7. On potassium wasting diuretic therapy  Also on KCL 20 meq daily. Return in about 6 months (around 7/4/2021). Patient given educational materials - see patient instructions. Discussed use, benefit, and side effects of prescribed medications. All patient questions answered. Pt voiced understanding.       Electronically signed JASKARAN Lopez CNP on 1/4/21 at 8:48 AM EST

## 2021-01-05 ENCOUNTER — TELEPHONE (OUTPATIENT)
Dept: INTERNAL MEDICINE CLINIC | Age: 56
End: 2021-01-05

## 2021-01-05 NOTE — PROGRESS NOTES
After obtaining consent, and per orders of Vale Crane CNP, injection of Afluria given in Right deltoid by KATHRYN LAZO. Patient instructed to remain in clinic for 20 minutes afterwards, and to report any adverse reaction to me immediately.

## 2021-01-05 NOTE — TELEPHONE ENCOUNTER
----- Message from JASKARAN Ruiz CNP sent at 1/4/2021  9:05 PM EST -----  All labs normal. No lab reason for cramping. See PCP for further workup for this.

## 2021-02-04 DIAGNOSIS — I10 ESSENTIAL HYPERTENSION: ICD-10-CM

## 2021-02-04 RX ORDER — POTASSIUM CHLORIDE 20 MEQ/1
TABLET, EXTENDED RELEASE ORAL
Qty: 28 TABLET | Refills: 5 | Status: SHIPPED | OUTPATIENT
Start: 2021-02-04 | End: 2022-02-16 | Stop reason: SDUPTHER

## 2021-03-09 DIAGNOSIS — I10 ESSENTIAL HYPERTENSION: ICD-10-CM

## 2021-03-10 RX ORDER — HYDROCHLOROTHIAZIDE 25 MG/1
25 TABLET ORAL DAILY
Qty: 90 TABLET | Refills: 1 | Status: SHIPPED | OUTPATIENT
Start: 2021-03-10 | End: 2021-07-28 | Stop reason: SDUPTHER

## 2021-04-07 ENCOUNTER — TELEPHONE (OUTPATIENT)
Dept: PHARMACY | Age: 56
End: 2021-04-07

## 2021-04-07 NOTE — TELEPHONE ENCOUNTER
Medication Management   [facility]. [facility]  [phone] (phone)  [phone] (fax)      Patient called at this time to follow up on smoking cessation. It has been 6 months since pt started program, so 6 month follow up call. Encounter completed via telephone. Have you smoked any, even 1 puff, in the last 7 days? Yes  If yes, how much? 0.75 PPD. Baseline PPD: 1  Current PPD: 0.75  Smoking Reduction Percent from initial baseline: 25%    Pt is currently using nothing at this time to help him with his smoking cessation. We last spoke to pt on 12/30/2020 and he then stated he stopped taking the chantix on 12/6/2020. Plan:  I asked pt how he was doing with smoking cessation. PT states still smoking about 15 cigarettes a day and he states he still wants to quit. I did ask if he resumed the Chantix and pt states he did not. I asked pt if any interest in trying NRT- pt states he has tried the gum in the past and did not like it. He states he has also used the patches in the past but her stated \"they made his heart flutter\"  I suggested Nicotine lozenges to the pt and pt seemed interested in trying the lozenges. I told pt that lozenges can be purchased over the counter and can be used to help with cravings. I went over how to use the lozenges and side effects. Pt says \"I may have to get some of them to try\". I asked pt if still smokes first cigarette within 30\" of waking up and he said yes so I told him he would get the 4mg lozenge. I also reminded pt to use all the information we already provided him earlier in the program.  I also suggested to pt that he re-look through the booklet we gave him in the beginning. Pt states he still has the book and will do that. - OTC Nicotine Lozenge   - 4 mg for those who smoke first cigarette within 30 minutes of waking, otherwise 2 mg   - Dissolve 1 lozenge every 1-2 hours when urge to smoke occurs;  Max 20/day   - Do not chew

## 2021-06-09 DIAGNOSIS — R79.89 HIGH SERUM LOW DENSITY LIPOPROTEIN (LDL) CHOLESTEROL: ICD-10-CM

## 2021-06-10 RX ORDER — ATORVASTATIN CALCIUM 20 MG/1
20 TABLET, FILM COATED ORAL DAILY
Qty: 90 TABLET | Refills: 0 | Status: SHIPPED | OUTPATIENT
Start: 2021-06-10 | End: 2021-08-04 | Stop reason: SDUPTHER

## 2021-07-07 ENCOUNTER — TELEPHONE (OUTPATIENT)
Dept: PHARMACY | Age: 56
End: 2021-07-07

## 2021-07-07 NOTE — TELEPHONE ENCOUNTER
Medication Management   [facility]. [facility]  [phone] (phone)  [phone] (fax)      Patient called at this time to follow up on smoking cessation. This is 9 month follow up from starting the program.     Encounter completed via telephone. Have you smoked any, even 1 puff, in the last 7 days? Yes  If yes, how much? approx 1 PPD What were you doing/do you know what the trigger was? Mornings and stress. Baseline PPD: 1  Current PPD: 1  Smoking Reduction Percent from initial baseline: 0%      Plan:  Pt states he just recently got the nicotine lozenges and also got nicotine gum. He states he has not used either of them yet. I went over correct usage of gum and lozenges with pt. Explained to pt that he could use gum or lozenges but do not put both in mouth at same time and to not use more than a total of 20 combined a day. We discussed the \"chew and park\"technique for the gum. We also discussed some ways to deal with morning cravings. Pt had no questions at this time.    Total time spent on phone with pt:  7 minutes    I explained to pt that we will be calling back in 3 months for the 1 year follow up from starting the program.

## 2021-07-27 LAB
CHOLESTEROL, TOTAL: ABNORMAL
CHOLESTEROL/HDL RATIO: ABNORMAL
HDLC SERPL-MCNC: 33 MG/DL (ref 35–70)
LDL CHOLESTEROL CALCULATED: 52 MG/DL (ref 0–160)
NONHDLC SERPL-MCNC: ABNORMAL MG/DL
TRIGL SERPL-MCNC: 110 MG/DL
VLDLC SERPL CALC-MCNC: ABNORMAL MG/DL

## 2021-07-28 ENCOUNTER — OFFICE VISIT (OUTPATIENT)
Dept: INTERNAL MEDICINE CLINIC | Age: 56
End: 2021-07-28
Payer: MEDICARE

## 2021-07-28 VITALS
WEIGHT: 289 LBS | BODY MASS INDEX: 37.09 KG/M2 | HEART RATE: 81 BPM | TEMPERATURE: 97.3 F | HEIGHT: 74 IN | SYSTOLIC BLOOD PRESSURE: 122 MMHG | DIASTOLIC BLOOD PRESSURE: 72 MMHG

## 2021-07-28 DIAGNOSIS — I50.32 CHRONIC DIASTOLIC CHF (CONGESTIVE HEART FAILURE) (HCC): ICD-10-CM

## 2021-07-28 DIAGNOSIS — I10 ESSENTIAL HYPERTENSION: ICD-10-CM

## 2021-07-28 DIAGNOSIS — K59.00 CONSTIPATION, UNSPECIFIED CONSTIPATION TYPE: ICD-10-CM

## 2021-07-28 PROCEDURE — G8417 CALC BMI ABV UP PARAM F/U: HCPCS | Performed by: INTERNAL MEDICINE

## 2021-07-28 PROCEDURE — 93000 ELECTROCARDIOGRAM COMPLETE: CPT | Performed by: INTERNAL MEDICINE

## 2021-07-28 PROCEDURE — 99214 OFFICE O/P EST MOD 30 MIN: CPT | Performed by: INTERNAL MEDICINE

## 2021-07-28 PROCEDURE — 3017F COLORECTAL CA SCREEN DOC REV: CPT | Performed by: INTERNAL MEDICINE

## 2021-07-28 PROCEDURE — 4004F PT TOBACCO SCREEN RCVD TLK: CPT | Performed by: INTERNAL MEDICINE

## 2021-07-28 PROCEDURE — G8427 DOCREV CUR MEDS BY ELIG CLIN: HCPCS | Performed by: INTERNAL MEDICINE

## 2021-07-28 RX ORDER — DOCUSATE SODIUM 100 MG/1
CAPSULE, LIQUID FILLED ORAL
Qty: 90 CAPSULE | Refills: 1 | Status: SHIPPED | OUTPATIENT
Start: 2021-07-28 | End: 2022-01-25

## 2021-07-28 RX ORDER — HYDROCHLOROTHIAZIDE 25 MG/1
25 TABLET ORAL DAILY
Qty: 90 TABLET | Refills: 1 | Status: SHIPPED | OUTPATIENT
Start: 2021-07-28 | End: 2022-01-25

## 2021-07-28 RX ORDER — CARVEDILOL 25 MG/1
TABLET ORAL
Qty: 180 TABLET | Refills: 1 | Status: SHIPPED | OUTPATIENT
Start: 2021-07-28 | End: 2022-02-16 | Stop reason: SDUPTHER

## 2021-07-28 ASSESSMENT — ENCOUNTER SYMPTOMS
DIARRHEA: 0
COUGH: 0
ABDOMINAL PAIN: 0
NAUSEA: 0
CONSTIPATION: 0
VOMITING: 0
SHORTNESS OF BREATH: 0

## 2021-07-28 ASSESSMENT — COPD QUESTIONNAIRES: COPD: 1

## 2021-07-28 NOTE — PROGRESS NOTES
[facility] INTER[facility]INE  [address] [facility] [address]  Dept: [phone]  Dept Fax: [phone] : [phone]     Visit Date:  7/28/2021    Patient:  Dileep Devi  YOB: 1965    HPI:     Chief Complaint   Patient presents with    COPD    Hypertension     ekg today       PCP - Mario Geronimo - was evaluated by him yesterday     This pt is being seen for the first time by this observer, previously seen MARGOTH Riojas, on this 6 month visit. NO CP, or SOB, no PND  Or   Any recent fever or chills. Got both of this covid vaccines. No recent hospitalizations, pt is a pleasant AF male not in distress. , hx of CHF denies cardiac stents. Unfortunately still smoking about a pack per day. HTN - /88. Stable on HCTZ 25 mg daily, Losartan 100 mg daily, Coreg 25 mg BID    CMP - On Coreg 25 mg BID, Losartan 100 mg daily, potassium 20 meq daily. EKG today SR, one PVD noted, rate 86, wide  ms,  ms, QTc 411, . Denies SOB, KING, orthopnea, chest pain. Tolerating activity without difficulty. Has not been taking aspirin and lipitor    Last echo - 7/30/2020 - EF 55%. , which was reviewed      COPD - On Spiriva and Advair(Tudorza?), has Albuterol PRN. Denies SOB, cough, sputum production.       Paranoid schizophrenia - On Depakote 1500 mg nightly, Invega sustenna, provided by [facility] every 3-4 months. Old MR were reviewed.          COPD  There is no cough or shortness of breath. Pertinent negatives include no chest pain, fever or myalgias. Hypertension  Pertinent negatives include no chest pain or shortness of breath.        Chronic diastolic CHF - systolic LV dysfunction is resolved on   meds      Medications    Current Outpatient Medications:     atorvastatin (LIPITOR) 20 MG tablet, Take 1 tablet by mouth daily, Disp: 90 tablet, Rfl: 0    hydroCHLOROthiazide (HYDRODIURIL) 25 MG tablet, Take 1 tablet by mouth daily, Disp: 90 tablet, Rfl: 1    potassium chloride (KLOR-CON M) 20 MEQ extended release tablet, TAKE 1 TABLET BY MOUTH DAILY. , Disp: 28 tablet, Rfl: 5    tamsulosin (FLOMAX) 0.4 MG capsule, Take 0.4 mg by mouth daily, Disp: , Rfl:     aspirin 81 MG chewable tablet, Take 1 tablet by mouth daily, Disp: 30 tablet, Rfl: 5    losartan (COZAAR) 100 MG tablet, Take 100 mg by mouth daily , Disp: , Rfl:     meloxicam (MOBIC) 7.5 MG tablet, TAKE 1 TABLET BY MOUTH DAILY, Disp: 28 tablet, Rfl: 3    carvedilol (COREG) 25 MG tablet, TAKE 1 TABLET BY MOUTH TWICE A DAY, Disp: 56 tablet, Rfl: 5    aclidinium (TUDORZA PRESSAIR) 400 MCG/ACT AEPB inhaler, Inhale 1 puff into the lungs 2 times daily, Disp: 1 each, Rfl: 5    fluticasone-salmeterol (WIXELA INHUB) 100-50 MCG/DOSE diskus inhaler, Inhale 1 puff into the lungs every 12 hours, Disp: 60 each, Rfl: 5    tiotropium (SPIRIVA HANDIHALER) 18 MCG inhalation capsule, Inhale 1 capsule into the lungs daily, Disp: 30 capsule, Rfl: 5    albuterol sulfate HFA (PROVENTIL HFA) 108 (90 Base) MCG/ACT inhaler, Inhale 2 puffs into the lungs every 6 hours as needed for Wheezing or Shortness of Breath, Disp: 1 Inhaler, Rfl: 5    fluticasone-salmeterol (ADVAIR) 100-50 MCG/DOSE diskus inhaler, Inhale 1 puff into the lungs every 12 hours, Disp: 60 each, Rfl: 5     MG capsule, TAKE 1 CAPSULE BY MOUTH DAILY. , Disp: 28 capsule, Rfl: 5    allopurinol (ZYLOPRIM) 300 MG tablet, Take 300 mg by mouth daily, Disp: , Rfl:     divalproex (DEPAKOTE ER) 500 MG ER tablet, Take 1,500 mg by mouth nightly, Disp: , Rfl:     paliperidone palmitate (INVEGA SUSTENNA) 156 MG/ML SUSP IM injection, Inject 156 mg into the muscle every 30 days. , Disp: , Rfl:     The patient has No Known Allergies.     Past Medical History  Fransico Melton  has a past medical history of CAD (coronary artery disease), Cardiomyopathy ([facility] [address] Chest pain, CHF (congestive heart failure) ([facility] [address] [facility] Depression, Gout, Hyperlipidemia, Hypertension, Left ventricular dysfunction, and Schizoaffective disorder ([facility] [address] Past Surgical History  The patient  has a past surgical history that includes Appendectomy; Adenoidectomy; Tonsillectomy; Colonoscopy; and Colonoscopy (Left, 11/18/2019). Family History  This patient's family history includes Arthritis in his mother. Social History  Godfrey Mckinley  reports that he has been smoking cigarettes. He started smoking about 39 years ago. He has a 15.00 pack-year smoking history. He has never used smokeless tobacco. He reports current drug use. Drug: Marijuana. He reports that he does not drink alcohol. Health Maintenance:    Health Maintenance   Topic Date Due    Hepatitis C screen  Never done    Pneumococcal 0-64 years Vaccine (1 of 2 - PPSV23) Never done    COVID-19 Vaccine (1) Never done    HIV screen  Never done    Shingles Vaccine (1 of 2) Never done   [employer] Visit (AWV)  Never done    Flu vaccine (1) 09/01/2021    Lipid screen  01/04/2022    Potassium monitoring  01/04/2022    Creatinine monitoring  01/04/2022    DTaP/Tdap/Td vaccine (2 - Td or Tdap) 06/03/2025    Colon cancer screen colonoscopy  11/18/2029    Hepatitis A vaccine  Aged Out    Hepatitis B vaccine  Aged Out    Hib vaccine  Aged Out    Meningococcal (ACWY) vaccine  Aged Out       Subjective:      Review of Systems   Constitutional: Negative for chills, fatigue and fever. Respiratory: Negative for cough and shortness of breath. Cardiovascular: Negative for chest pain and leg swelling. Gastrointestinal: Negative for abdominal pain, constipation, diarrhea, nausea and vomiting. Genitourinary: Negative for difficulty urinating. Musculoskeletal: Negative for arthralgias and myalgias. Skin: Negative for rash and wound. Neurological: Negative for tremors, weakness and numbness. Muscle cramps legs.     Psychiatric/Behavioral: Negative for agitation, confusion, dysphoric mood and hallucinations. The patient is not nervous/anxious. Objective:     /72 (Site: Right Upper Arm)   Pulse 81   Temp 97.3 °F (36.3 °C)   Ht 6' 2\" (1.88 m)   Wt 289 lb (131.1 kg)   BMI 37.11 kg/m²     Physical Exam  Vitals reviewed. Constitutional:       General: He is not in acute distress. Appearance: He is well-developed. He is not diaphoretic. HENT:      Head: Normocephalic and atraumatic. Mouth/Throat:      Pharynx: No oropharyngeal exudate. Neck:      Thyroid: No thyromegaly. Cardiovascular:      Rate and Rhythm: Normal rate and regular rhythm. Heart sounds: Normal heart sounds. No murmur heard. No friction rub. No gallop. Pulmonary:      Effort: Pulmonary effort is normal. No respiratory distress. Breath sounds: Normal breath sounds. No wheezing or rales. Abdominal:      General: There is no distension. Palpations: Abdomen is soft. Tenderness: There is no abdominal tenderness. Musculoskeletal:         General: No tenderness. Normal range of motion. Cervical back: Normal range of motion and neck supple. Lymphadenopathy:      Cervical: No cervical adenopathy. Skin:     General: Skin is warm and dry. Coloration: Skin is not pale. Findings: No erythema or rash. Neurological:      Mental Status: He is alert and oriented to person, place, and time. Labs Reviewed 7/28/2021:      Recent labs from [place] will be obtained. Lab Results   Component Value Date    WBC 7.7 01/04/2021    HGB 14.6 01/04/2021    HCT 45.0 01/04/2021     01/04/2021    CHOL 173 06/30/2020    TRIG 134 06/30/2020    HDL 37 06/30/2020    LDLDIRECT 97.38 01/04/2021    ALT 7 (L) 01/04/2021    AST 15 01/04/2021     01/04/2021    K 3.6 01/04/2021     01/04/2021    CREATININE 0.8 01/04/2021    BUN 12 01/04/2021    CO2 31 01/04/2021    TSH 1.320 03/16/2017    LABA1C 5.3 03/16/2017       Assessment/Plan      1.  Cardiomyopathy resolved, hx of LV dysfunction resolved. Echo reviewed. No signs of decompensation. No cardiac s/s. On beta blocker, ARB. Weight stable overall, range 286-302 in the last year, no SOB, KING, orthopnea, chest pain. - aspirin 81 MG chewable tablet; Take 1 tablet by mouth daily  Dispense: 30 tablet; Refill: 5    2. Essential hypertension  BP stable on current therapy. Continue. - EKG 12 Lead  - Magnesium; Future  - LDL Cholesterol, Direct; Future  - Comprehensive Metabolic Panel; Future  - CBC With Auto Differential; Future    3. High serum low density lipoprotein (LDL) cholesterol  Restart Lipitor. Recheck LDL with next labs. - Magnesium; Future  - LDL Cholesterol, Direct; Future  - Comprehensive Metabolic Panel; Future  - CBC With Auto Differential; Future  - atorvastatin (LIPITOR) 20 MG tablet; Take 1 tablet by mouth daily  Dispense: 30 tablet; Refill: 5    4. Need for prophylactic vaccination and inoculation against influenza  - INFLUENZA, QUADV, 3 YRS AND OLDER, IM PF, PREFILL SYR OR SDV, 0.5ML (AFLURIA QUADV, PF)  - [facility] ADMIN INFLUENZA VIRUS VAC    5. Leg cramps  No edema, homans negative, pulses intact, no skin changes. Will check lytes, renal function, CBC.   - Magnesium; Future  - LDL Cholesterol, Direct; Future  - Comprehensive Metabolic Panel; Future  - CBC With Auto Differential; Future    6. Smoking cessation - was highly recommended, due to risk factors. And spoke with him regarding weight loss of 5-10 lbs. Return in about 6 months (around 1/28/2022).        Electronically signed Karl Anton MD on 1/4/21 at 8:48 AM EST

## 2021-08-04 DIAGNOSIS — R79.89 HIGH SERUM LOW DENSITY LIPOPROTEIN (LDL) CHOLESTEROL: ICD-10-CM

## 2021-08-06 RX ORDER — ATORVASTATIN CALCIUM 20 MG/1
20 TABLET, FILM COATED ORAL DAILY
Qty: 90 TABLET | Refills: 1 | Status: SHIPPED | OUTPATIENT
Start: 2021-08-06 | End: 2022-02-16 | Stop reason: SDUPTHER

## 2021-10-06 ENCOUNTER — TELEPHONE (OUTPATIENT)
Dept: PHARMACY | Age: 56
End: 2021-10-06

## 2021-10-06 NOTE — TELEPHONE ENCOUNTER
Medication Management   [facility]. [facility]  [phone] (phone)  [phone] (fax)      Patient called at this time for 1 year follow up since starting smoking cessation program.      Encounter completed via telephone. Have you smoked any, even 1 puff, in the last 7 days? Yes  If yes, how much? 1PPD. Baseline PPD: 1  Current PPD: 1  Smoking Reduction Percent from initial baseline: 0%    Pharmacological Agent used: None at this time. See notes below. Notes:  Doing 1 year follow up at this time since starting smoking cessation program.  Pt states he is still smoking 1PPD. Pt did ask about Chantix because he stated \"I heard they took it off the shelf so I have been afraid to use the Nicotine lozenges too because I did not know if they were taken off the shelf too\". I explained to pt that yes Chantix is recalled at this time but all other Smoking cessation meds/NRT are safe to use. Pt said \"that is good to know\". Pt states \"I have nicotine lozenges and plan to use them, now that I know they are safe, when I am really ready to try and quit smoking\". I asked pt if he was ready to try and quit now and he states \"not at this time\". I told pt this was our last phone call for the phone program and if he ever has questions or wants our help again to call us anytime. Pt understood. Pt had no questions at this time.    Total time spent on phone with pt:  6 minutes

## 2022-01-25 DIAGNOSIS — K59.00 CONSTIPATION, UNSPECIFIED CONSTIPATION TYPE: ICD-10-CM

## 2022-01-25 DIAGNOSIS — I10 ESSENTIAL HYPERTENSION: ICD-10-CM

## 2022-01-25 RX ORDER — HYDROCHLOROTHIAZIDE 25 MG/1
25 TABLET ORAL DAILY
Qty: 28 TABLET | Refills: 2 | Status: SHIPPED | OUTPATIENT
Start: 2022-01-25 | End: 2022-08-22 | Stop reason: SDUPTHER

## 2022-01-25 RX ORDER — DOCUSATE SODIUM 100 MG/1
CAPSULE, LIQUID FILLED ORAL
Qty: 28 CAPSULE | Refills: 3 | Status: SHIPPED | OUTPATIENT
Start: 2022-01-25 | End: 2022-05-16

## 2022-02-16 ENCOUNTER — OFFICE VISIT (OUTPATIENT)
Dept: INTERNAL MEDICINE CLINIC | Age: 57
End: 2022-02-16
Payer: COMMERCIAL

## 2022-02-16 VITALS
HEART RATE: 92 BPM | BODY MASS INDEX: 37.52 KG/M2 | WEIGHT: 292.2 LBS | DIASTOLIC BLOOD PRESSURE: 76 MMHG | TEMPERATURE: 98.4 F | SYSTOLIC BLOOD PRESSURE: 136 MMHG

## 2022-02-16 DIAGNOSIS — J44.9 CHRONIC OBSTRUCTIVE PULMONARY DISEASE, UNSPECIFIED COPD TYPE (HCC): ICD-10-CM

## 2022-02-16 DIAGNOSIS — R79.89 HIGH SERUM LOW DENSITY LIPOPROTEIN (LDL) CHOLESTEROL: ICD-10-CM

## 2022-02-16 DIAGNOSIS — I50.32 CHRONIC DIASTOLIC CHF (CONGESTIVE HEART FAILURE) (HCC): ICD-10-CM

## 2022-02-16 DIAGNOSIS — I42.9 CARDIOMYOPATHY, UNSPECIFIED TYPE (HCC): ICD-10-CM

## 2022-02-16 DIAGNOSIS — I10 ESSENTIAL HYPERTENSION: ICD-10-CM

## 2022-02-16 PROCEDURE — 99213 OFFICE O/P EST LOW 20 MIN: CPT | Performed by: INTERNAL MEDICINE

## 2022-02-16 RX ORDER — TIOTROPIUM BROMIDE 18 UG/1
18 CAPSULE ORAL; RESPIRATORY (INHALATION) DAILY
Qty: 30 CAPSULE | Refills: 5 | Status: SHIPPED | OUTPATIENT
Start: 2022-02-16 | End: 2022-02-18 | Stop reason: ALTCHOICE

## 2022-02-16 RX ORDER — ATORVASTATIN CALCIUM 20 MG/1
20 TABLET, FILM COATED ORAL DAILY
Qty: 90 TABLET | Refills: 1 | Status: SHIPPED | OUTPATIENT
Start: 2022-02-16 | End: 2022-08-22 | Stop reason: SDUPTHER

## 2022-02-16 RX ORDER — POTASSIUM CHLORIDE 20 MEQ/1
TABLET, EXTENDED RELEASE ORAL
Qty: 28 TABLET | Refills: 5 | Status: SHIPPED | OUTPATIENT
Start: 2022-02-16 | End: 2022-08-22 | Stop reason: SDUPTHER

## 2022-02-16 RX ORDER — ASPIRIN 81 MG/1
81 TABLET, CHEWABLE ORAL DAILY
Qty: 30 TABLET | Refills: 5 | Status: SHIPPED | OUTPATIENT
Start: 2022-02-16 | End: 2022-08-22 | Stop reason: SDUPTHER

## 2022-02-16 RX ORDER — MELOXICAM 7.5 MG/1
TABLET ORAL
Qty: 28 TABLET | Refills: 3 | Status: CANCELLED | OUTPATIENT
Start: 2022-02-16

## 2022-02-16 RX ORDER — ALBUTEROL SULFATE 90 UG/1
2 AEROSOL, METERED RESPIRATORY (INHALATION) EVERY 6 HOURS PRN
Qty: 1 EACH | Refills: 2 | Status: SHIPPED | OUTPATIENT
Start: 2022-02-16

## 2022-02-16 RX ORDER — CARVEDILOL 25 MG/1
TABLET ORAL
Qty: 180 TABLET | Refills: 1 | Status: SHIPPED | OUTPATIENT
Start: 2022-02-16 | End: 2022-08-22 | Stop reason: SDUPTHER

## 2022-02-16 SDOH — ECONOMIC STABILITY: FOOD INSECURITY: WITHIN THE PAST 12 MONTHS, YOU WORRIED THAT YOUR FOOD WOULD RUN OUT BEFORE YOU GOT MONEY TO BUY MORE.: NEVER TRUE

## 2022-02-16 SDOH — ECONOMIC STABILITY: FOOD INSECURITY: WITHIN THE PAST 12 MONTHS, THE FOOD YOU BOUGHT JUST DIDN'T LAST AND YOU DIDN'T HAVE MONEY TO GET MORE.: NEVER TRUE

## 2022-02-16 ASSESSMENT — PATIENT HEALTH QUESTIONNAIRE - PHQ9
2. FEELING DOWN, DEPRESSED OR HOPELESS: 0
5. POOR APPETITE OR OVEREATING: 0
10. IF YOU CHECKED OFF ANY PROBLEMS, HOW DIFFICULT HAVE THESE PROBLEMS MADE IT FOR YOU TO DO YOUR WORK, TAKE CARE OF THINGS AT HOME, OR GET ALONG WITH OTHER PEOPLE: 0
3. TROUBLE FALLING OR STAYING ASLEEP: 0
SUM OF ALL RESPONSES TO PHQ QUESTIONS 1-9: 0
4. FEELING TIRED OR HAVING LITTLE ENERGY: 0
1. LITTLE INTEREST OR PLEASURE IN DOING THINGS: 0
SUM OF ALL RESPONSES TO PHQ QUESTIONS 1-9: 0
9. THOUGHTS THAT YOU WOULD BE BETTER OFF DEAD, OR OF HURTING YOURSELF: 0
6. FEELING BAD ABOUT YOURSELF - OR THAT YOU ARE A FAILURE OR HAVE LET YOURSELF OR YOUR FAMILY DOWN: 0
SUM OF ALL RESPONSES TO PHQ9 QUESTIONS 1 & 2: 0
7. TROUBLE CONCENTRATING ON THINGS, SUCH AS READING THE NEWSPAPER OR WATCHING TELEVISION: 0
8. MOVING OR SPEAKING SO SLOWLY THAT OTHER PEOPLE COULD HAVE NOTICED. OR THE OPPOSITE, BEING SO FIGETY OR RESTLESS THAT YOU HAVE BEEN MOVING AROUND A LOT MORE THAN USUAL: 0

## 2022-02-16 ASSESSMENT — ENCOUNTER SYMPTOMS
NAUSEA: 0
VOMITING: 0
CONSTIPATION: 0
SHORTNESS OF BREATH: 0
ABDOMINAL PAIN: 0
DIARRHEA: 0
COUGH: 0

## 2022-02-16 ASSESSMENT — COPD QUESTIONNAIRES: COPD: 1

## 2022-02-16 ASSESSMENT — SOCIAL DETERMINANTS OF HEALTH (SDOH): HOW HARD IS IT FOR YOU TO PAY FOR THE VERY BASICS LIKE FOOD, HOUSING, MEDICAL CARE, AND HEATING?: NOT HARD AT ALL

## 2022-02-16 NOTE — PROGRESS NOTES
[facility] Wolf [facility] INTERNAL MEDICINE  [address] [facility] [address]  Dept: [phone]  Dept Fax: [phone]: [phone]     Visit Date:  2/16/2022    Patient:  Maira Rowe  YOB: 1965    HPI:     Chief Complaint   Patient presents with    Hypertension    Cardiomyopathy    Other     Leg cramps       PCP - Alejandro Sevilla - was evaluated by him yesterday         Was in urgent care recently due to RLE abd pain , and got discharged. No UTI symptoms. pt is a pleasant AF male not in distress. , hx of CHF denies cardiac stents. NO LOC or any bleeding issues. , NO PND or orthopnea. Unfortunately still smoking about a pack per day. HTN - BP is . Stable on HCTZ 25 mg daily, Losartan 100 mg daily, Coreg 25 mg BID    CMP - On Coreg 25 mg BID, Losartan 100 mg daily, potassium 20 meq daily. EKG today SR, one PVD noted, rate 86, wide  ms,  ms, QTc 411, . Denies SOB, KING, orthopnea, chest pain. Tolerating activity without difficulty. Has not been taking aspirin and lipitor    Last echo - 7/30/2020 - EF 55%. , which was reviewed      COPD - On Spiriva and Advair(Tudorza?), has Albuterol PRN. Denies SOB, cough, sputum production.       Paranoid schizophrenia - On Depakote 1500 mg nightly, Invega sustenna, provided by [facility] every 3-4 months. Old MR were reviewed.          COPD  There is no cough or shortness of breath. Pertinent negatives include no chest pain, fever or myalgias. Hypertension  Pertinent negatives include no chest pain or shortness of breath. Other  Pertinent negatives include no abdominal pain, arthralgias, chest pain, chills, coughing, fatigue, fever, myalgias, nausea, numbness, rash, vomiting or weakness.        Chronic diastolic CHF - systolic LV dysfunction is resolved on   meds      Medications    Current Outpatient Medications:     docusate sodium (COLACE) 100 MG capsule, TAKE 1 CAPSULE BY MOUTH DAILY AS NEEDED, Disp: 28 capsule, Rfl: 3    hydroCHLOROthiazide (HYDRODIURIL) 25 MG tablet, TAKE 1 TABLET BY MOUTH DAILY, Disp: 28 tablet, Rfl: 2    atorvastatin (LIPITOR) 20 MG tablet, Take 1 tablet by mouth daily, Disp: 90 tablet, Rfl: 1    carvedilol (COREG) 25 MG tablet, TAKE 1 TABLET BY MOUTH TWICE A DAY, Disp: 180 tablet, Rfl: 1    potassium chloride (KLOR-CON M) 20 MEQ extended release tablet, TAKE 1 TABLET BY MOUTH DAILY. , Disp: 28 tablet, Rfl: 5    tamsulosin (FLOMAX) 0.4 MG capsule, Take 0.4 mg by mouth daily, Disp: , Rfl:     aspirin 81 MG chewable tablet, Take 1 tablet by mouth daily, Disp: 30 tablet, Rfl: 5    losartan (COZAAR) 100 MG tablet, Take 100 mg by mouth daily , Disp: , Rfl:     meloxicam (MOBIC) 7.5 MG tablet, TAKE 1 TABLET BY MOUTH DAILY, Disp: 28 tablet, Rfl: 3    aclidinium (TUDORZA PRESSAIR) 400 MCG/ACT AEPB inhaler, Inhale 1 puff into the lungs 2 times daily, Disp: 1 each, Rfl: 5    tiotropium (SPIRIVA HANDIHALER) 18 MCG inhalation capsule, Inhale 1 capsule into the lungs daily, Disp: 30 capsule, Rfl: 5    albuterol sulfate HFA (PROVENTIL HFA) 108 (90 Base) MCG/ACT inhaler, Inhale 2 puffs into the lungs every 6 hours as needed for Wheezing or Shortness of Breath, Disp: 1 Inhaler, Rfl: 5    allopurinol (ZYLOPRIM) 300 MG tablet, Take 300 mg by mouth daily, Disp: , Rfl:     divalproex (DEPAKOTE ER) 500 MG ER tablet, Take 1,500 mg by mouth nightly, Disp: , Rfl:     paliperidone palmitate (INVEGA SUSTENNA) 156 MG/ML SUSP IM injection, Inject 156 mg into the muscle every 30 days. , Disp: , Rfl:     The patient has No Known Allergies. Past Medical History  Viola Mack  has a past medical history of CAD (coronary artery disease), Cardiomyopathy ([address] Chest pain, CHF (congestive heart failure) ([facility] [address] COPD exacerbation ([facility] [address] Depression, Gout, Hyperlipidemia, Hypertension, Left ventricular dysfunction, and Schizoaffective disorder ([facility] [address]     Past Surgical History  The patient  has a past surgical history that includes Appendectomy; Adenoidectomy; Tonsillectomy; Colonoscopy; and Colonoscopy (Left, 11/18/2019). Family History  This patient's family history includes Arthritis in his mother. Social History  Vincenzo Spencer  reports that he has been smoking cigarettes. He started smoking about 40 years ago. He has a 15.00 pack-year smoking history. He has never used smokeless tobacco. He reports current drug use. Drug: Marijuana [place]per). He reports that he does not drink alcohol. Health Maintenance:    Health Maintenance   Topic Date Due    Hepatitis C screen  Never done    Pneumococcal 0-64 years Vaccine (1 of 2 - PPSV23) Never done    Depression Monitoring  Never done    HIV screen  Never done    Shingles Vaccine (1 of 2) Never done   [employer] Visit (AWV)  Never done    Potassium monitoring  01/04/2022    Creatinine monitoring  01/04/2022    Lipid screen  07/27/2022    DTaP/Tdap/Td vaccine (2 - Td or Tdap) 06/03/2025    Colorectal Cancer Screen  11/18/2029    Flu vaccine  Completed    COVID-19 Vaccine  Completed    Hepatitis A vaccine  Aged Out    Hepatitis B vaccine  Aged Out    Hib vaccine  Aged Out    Meningococcal (ACWY) vaccine  Aged Out       Subjective:      Review of Systems   Constitutional: Negative for chills, fatigue and fever. Respiratory: Negative for cough and shortness of breath. Cardiovascular: Negative for chest pain and leg swelling. Gastrointestinal: Negative for abdominal pain, constipation, diarrhea, nausea and vomiting. Genitourinary: Negative for difficulty urinating. Musculoskeletal: Negative for arthralgias and myalgias. Skin: Negative for rash and wound. Neurological: Negative for tremors, weakness and numbness. Muscle cramps legs. Psychiatric/Behavioral: Negative for agitation, confusion, dysphoric mood and hallucinations. The patient is not nervous/anxious.         Objective: obese AF male not in distress today      BP 136/76 (Site: Left Upper Arm)   Pulse 92   Temp 98.4 °F (36.9 °C)   Wt 292 lb 3.2 oz (132.5 kg)   BMI 37.52 kg/m²     Physical Exam  Vitals reviewed. Constitutional:       General: He is not in acute distress. Appearance: He is well-developed. He is not diaphoretic. HENT:      Head: Normocephalic and atraumatic. Mouth/Throat:      Pharynx: No oropharyngeal exudate. Neck:      Thyroid: No thyromegaly. Cardiovascular:      Rate and Rhythm: Normal rate and regular rhythm. Heart sounds: Normal heart sounds. No murmur heard. No friction rub. No gallop. Pulmonary:      Effort: Pulmonary effort is normal. No respiratory distress. Breath sounds: Normal breath sounds. No wheezing or rales. Abdominal:      General: There is no distension. Palpations: Abdomen is soft. Tenderness: There is no abdominal tenderness. Musculoskeletal:         General: No tenderness. Normal range of motion. Cervical back: Normal range of motion and neck supple. Lymphadenopathy:      Cervical: No cervical adenopathy. Skin:     General: Skin is warm and dry. Coloration: Skin is not pale. Findings: No erythema or rash. Neurological:      Mental Status: He is alert and oriented to person, place, and time. Labs Reviewed 2/16/2022:      [facility]. Lab Results   Component Value Date    WBC 7.7 01/04/2021    HGB 14.6 01/04/2021    HCT 45.0 01/04/2021     01/04/2021    CHOL 173 06/30/2020    TRIG 110 07/27/2021    HDL 33 (A) 07/27/2021    LDLDIRECT 97.38 01/04/2021    ALT 7 (L) 01/04/2021    AST 15 01/04/2021     01/04/2021    K 3.6 01/04/2021     01/04/2021    CREATININE 0.8 01/04/2021    BUN 12 01/04/2021    CO2 31 01/04/2021    TSH 1.320 03/16/2017    LABA1C 5.3 03/16/2017       Assessment/Plan      1. Cardiomyopathy resolved, hx of LV dysfunction resolved.   Previous echo reviewed with pt , EF is normal at 55 %    No signs of decompensation. No cardiac s/s. On beta blocker, ARB. Weight stable overall, range 286-302 in the last year, no SOB, KING, orthopnea, chest pain. - aspirin 81 MG chewable tablet; Take 1 tablet by mouth daily  Dispense: 30 tablet; Refill: 5    2. Essential hypertension  BP stable on current therapy. Continue. - EKG 12 Lead  - Magnesium; Future  - LDL Cholesterol, Direct; Future  - Comprehensive Metabolic Panel; Future  - CBC With Auto Differential; Future    3. High serum low density lipoprotein (LDL) cholesterol  Restart Lipitor. Recheck LDL with next labs. - Magnesium; Future  - LDL Cholesterol, Direct; Future  - Comprehensive Metabolic Panel; Future  - CBC With Auto Differential; Future  - atorvastatin (LIPITOR) 20 MG tablet; Take 1 tablet by mouth daily  Dispense: 30 tablet; Refill: 5      4. Smoking cessation - was highly recommended, due to risk factors. And spoke with him regarding weight loss of 5-10 lbs. He is up to date  On covid and influenza vaccine. RTO  6 months .       Electronically signed Candy Evans MD on 1/4/21 at 8:48 AM EST

## 2022-02-17 ENCOUNTER — TELEPHONE (OUTPATIENT)
Dept: INTERNAL MEDICINE CLINIC | Age: 57
End: 2022-02-17

## 2022-02-17 NOTE — TELEPHONE ENCOUNTER
[facility]SE called and was concerned because of Spiriva and Gracy Gourd being ordered by you. Both are long lasting and can not mix. They said they wanted to know if this order is what you want to go with or if this was a mistake. Stating the medications were flagged to not be taken together. What is our plan for this?

## 2022-02-17 NOTE — TELEPHONE ENCOUNTER
Lets go with cheaper of the two for pt, check with his pharmacy   And discontinue the other on the med list.       thanks

## 2022-02-18 NOTE — TELEPHONE ENCOUNTER
Spoke with Teresa Parrish from [facility]. Explained to me that both are covered free of charge by [facility]. Wyvonnia Churchman is the most medically recently filled medication so we stuck with Tudorza and discontinuing Spiriva. I will discontinue the Spiriva.

## 2022-04-12 ENCOUNTER — TELEPHONE (OUTPATIENT)
Dept: INTERNAL MEDICINE CLINIC | Age: 57
End: 2022-04-12

## 2022-04-21 NOTE — TELEPHONE ENCOUNTER
I am ok with the change to Spiriva 2 puffs ( one cap ) inhaled QD  One month supply with 5 refills    Thanks

## 2022-05-03 RX ORDER — TIOTROPIUM BROMIDE 18 UG/1
18 CAPSULE ORAL; RESPIRATORY (INHALATION) DAILY
Qty: 90 CAPSULE | Refills: 1 | OUTPATIENT
Start: 2022-05-03 | End: 2022-08-29 | Stop reason: SDUPTHER

## 2022-05-16 DIAGNOSIS — K59.00 CONSTIPATION, UNSPECIFIED CONSTIPATION TYPE: ICD-10-CM

## 2022-05-16 RX ORDER — DOCUSATE SODIUM 100 MG/1
CAPSULE, LIQUID FILLED ORAL
Qty: 28 CAPSULE | Refills: 3 | Status: SHIPPED | OUTPATIENT
Start: 2022-05-16 | End: 2022-09-06

## 2022-08-08 DIAGNOSIS — I10 ESSENTIAL HYPERTENSION: ICD-10-CM

## 2022-08-08 DIAGNOSIS — I42.9 CARDIOMYOPATHY, UNSPECIFIED TYPE (HCC): ICD-10-CM

## 2022-08-08 DIAGNOSIS — R79.89 HIGH SERUM LOW DENSITY LIPOPROTEIN (LDL) CHOLESTEROL: ICD-10-CM

## 2022-08-08 RX ORDER — POTASSIUM CHLORIDE 20 MEQ/1
TABLET, EXTENDED RELEASE ORAL
Qty: 28 TABLET | Refills: 5 | OUTPATIENT
Start: 2022-08-08

## 2022-08-08 RX ORDER — ASPIRIN 81 MG
TABLET,CHEWABLE ORAL
Qty: 28 TABLET | OUTPATIENT
Start: 2022-08-08

## 2022-08-08 RX ORDER — ATORVASTATIN CALCIUM 20 MG/1
20 TABLET, FILM COATED ORAL DAILY
Qty: 28 TABLET | OUTPATIENT
Start: 2022-08-08

## 2022-08-22 DIAGNOSIS — I10 ESSENTIAL HYPERTENSION: ICD-10-CM

## 2022-08-22 DIAGNOSIS — R79.89 HIGH SERUM LOW DENSITY LIPOPROTEIN (LDL) CHOLESTEROL: ICD-10-CM

## 2022-08-22 DIAGNOSIS — I42.9 CARDIOMYOPATHY, UNSPECIFIED TYPE (HCC): ICD-10-CM

## 2022-08-22 DIAGNOSIS — I50.32 CHRONIC DIASTOLIC CHF (CONGESTIVE HEART FAILURE) (HCC): ICD-10-CM

## 2022-08-22 RX ORDER — ASPIRIN 81 MG/1
81 TABLET, CHEWABLE ORAL DAILY
Qty: 30 TABLET | Refills: 1 | Status: SHIPPED | OUTPATIENT
Start: 2022-08-22 | End: 2022-08-29 | Stop reason: SDUPTHER

## 2022-08-22 RX ORDER — HYDROCHLOROTHIAZIDE 25 MG/1
25 TABLET ORAL DAILY
Qty: 30 TABLET | Refills: 1 | Status: SHIPPED | OUTPATIENT
Start: 2022-08-22 | End: 2022-08-29 | Stop reason: SDUPTHER

## 2022-08-22 RX ORDER — POTASSIUM CHLORIDE 20 MEQ/1
TABLET, EXTENDED RELEASE ORAL
Qty: 30 TABLET | Refills: 1 | Status: SHIPPED | OUTPATIENT
Start: 2022-08-22 | End: 2022-08-29 | Stop reason: SDUPTHER

## 2022-08-22 RX ORDER — CARVEDILOL 25 MG/1
TABLET ORAL
Qty: 180 TABLET | Refills: 0 | Status: SHIPPED | OUTPATIENT
Start: 2022-08-22 | End: 2022-08-29 | Stop reason: SDUPTHER

## 2022-08-22 RX ORDER — ATORVASTATIN CALCIUM 20 MG/1
20 TABLET, FILM COATED ORAL DAILY
Qty: 90 TABLET | Refills: 0 | Status: SHIPPED | OUTPATIENT
Start: 2022-08-22 | End: 2022-09-14 | Stop reason: SDUPTHER

## 2022-08-23 ENCOUNTER — NURSE ONLY (OUTPATIENT)
Dept: LAB | Age: 57
End: 2022-08-23

## 2022-08-23 LAB
ALBUMIN SERPL-MCNC: 4.2 G/DL (ref 3.5–5.1)
ALP BLD-CCNC: 48 U/L (ref 38–126)
ALT SERPL-CCNC: 6 U/L (ref 11–66)
ANION GAP SERPL CALCULATED.3IONS-SCNC: 7 MEQ/L (ref 8–16)
AST SERPL-CCNC: 13 U/L (ref 5–40)
BILIRUB SERPL-MCNC: 0.5 MG/DL (ref 0.3–1.2)
BUN BLDV-MCNC: 13 MG/DL (ref 7–22)
CALCIUM SERPL-MCNC: 9.1 MG/DL (ref 8.5–10.5)
CHLORIDE BLD-SCNC: 104 MEQ/L (ref 98–111)
CHOLESTEROL, TOTAL: 107 MG/DL (ref 100–199)
CHOLESTEROL, TOTAL: 123 MG/DL
CHOLESTEROL/HDL RATIO: NORMAL
CO2: 31 MEQ/L (ref 23–33)
CREAT SERPL-MCNC: 0.9 MG/DL (ref 0.4–1.2)
GLUCOSE BLD-MCNC: 82 MG/DL (ref 70–108)
HDLC SERPL-MCNC: 33 MG/DL
HDLC SERPL-MCNC: 35 MG/DL (ref 35–70)
LDL CHOLESTEROL CALCULATED: 61 MG/DL
LDL CHOLESTEROL CALCULATED: 67 MG/DL (ref 0–160)
NONHDLC SERPL-MCNC: NORMAL MG/DL
POTASSIUM SERPL-SCNC: 4.2 MEQ/L (ref 3.5–5.2)
SEDIMENTATION RATE, ERYTHROCYTE: 0 MM/HR (ref 0–10)
SODIUM BLD-SCNC: 142 MEQ/L (ref 135–145)
TOTAL PROTEIN: 6.6 G/DL (ref 6.1–8)
TRIGL SERPL-MCNC: 103 MG/DL
TRIGL SERPL-MCNC: 67 MG/DL (ref 0–199)
URIC ACID: 5.6 MG/DL (ref 3.7–7)
VLDLC SERPL CALC-MCNC: NORMAL MG/DL

## 2022-08-29 ENCOUNTER — OFFICE VISIT (OUTPATIENT)
Dept: INTERNAL MEDICINE CLINIC | Age: 57
End: 2022-08-29
Payer: COMMERCIAL

## 2022-08-29 VITALS
HEART RATE: 72 BPM | WEIGHT: 263 LBS | OXYGEN SATURATION: 98 % | DIASTOLIC BLOOD PRESSURE: 66 MMHG | SYSTOLIC BLOOD PRESSURE: 136 MMHG | TEMPERATURE: 97.1 F | BODY MASS INDEX: 33.75 KG/M2 | HEIGHT: 74 IN

## 2022-08-29 DIAGNOSIS — R79.89 HIGH SERUM LOW DENSITY LIPOPROTEIN (LDL) CHOLESTEROL: Primary | ICD-10-CM

## 2022-08-29 DIAGNOSIS — I10 ESSENTIAL HYPERTENSION: ICD-10-CM

## 2022-08-29 DIAGNOSIS — I50.32 CHRONIC DIASTOLIC CHF (CONGESTIVE HEART FAILURE) (HCC): ICD-10-CM

## 2022-08-29 DIAGNOSIS — I42.9 CARDIOMYOPATHY, UNSPECIFIED TYPE (HCC): ICD-10-CM

## 2022-08-29 PROCEDURE — 99213 OFFICE O/P EST LOW 20 MIN: CPT | Performed by: INTERNAL MEDICINE

## 2022-08-29 RX ORDER — CARVEDILOL 25 MG/1
TABLET ORAL
Qty: 180 TABLET | Refills: 2 | Status: SHIPPED | OUTPATIENT
Start: 2022-08-29

## 2022-08-29 RX ORDER — TIOTROPIUM BROMIDE 18 UG/1
18 CAPSULE ORAL; RESPIRATORY (INHALATION) DAILY
Qty: 90 CAPSULE | Refills: 2 | Status: SHIPPED | OUTPATIENT
Start: 2022-08-29

## 2022-08-29 RX ORDER — ASPIRIN 81 MG/1
81 TABLET, CHEWABLE ORAL DAILY
Qty: 30 TABLET | Refills: 5 | Status: SHIPPED | OUTPATIENT
Start: 2022-08-29

## 2022-08-29 RX ORDER — POTASSIUM CHLORIDE 20 MEQ/1
TABLET, EXTENDED RELEASE ORAL
Qty: 30 TABLET | Refills: 5 | Status: SHIPPED | OUTPATIENT
Start: 2022-08-29

## 2022-08-29 RX ORDER — HYDROCHLOROTHIAZIDE 25 MG/1
25 TABLET ORAL DAILY
Qty: 30 TABLET | Refills: 5 | Status: SHIPPED | OUTPATIENT
Start: 2022-08-29 | End: 2022-11-27

## 2022-08-29 RX ORDER — MELOXICAM 15 MG/1
TABLET ORAL
COMMUNITY
Start: 2022-08-23

## 2022-08-29 ASSESSMENT — ENCOUNTER SYMPTOMS
NAUSEA: 0
COUGH: 0
SHORTNESS OF BREATH: 0
DIARRHEA: 0
ABDOMINAL PAIN: 0
VOMITING: 0
CONSTIPATION: 0

## 2022-08-29 ASSESSMENT — COPD QUESTIONNAIRES: COPD: 1

## 2022-08-29 NOTE — PROGRESS NOTES
Cassandra Wolf  INTERNAL MEDICINE  [address] [facility] [address]  Dept: [phone]  Dept Fax: [phone] : [phone]     Visit Date:  8/29/2022    Patient:  Estefani Barcenas  YOB: 1965    HPI:     Chief Complaint   Patient presents with    Hypertension    Cardiomyopathy    COPD       PCP - Rose Mary Hartley       Obese pt , pleasant AF male not in distress. , hx of CHF denies cardiac stents. NO LOC or any bleeding issues. , NO PND or orthopnea. Unfortunately still smoking about a pack per day, missed the last appt due to death in the family. HTN - BP is . Stable on HCTZ 25 mg daily, Losartan 100 mg daily, Coreg 25 mg BID    CMP - On Coreg 25 mg BID, Losartan 100 mg daily, potassium 20 meq daily. EKG today SR, one PVD noted, rate 86, wide  ms,  ms, QTc 411, . Denies SOB, KING, orthopnea, chest pain. Tolerating activity without difficulty. Has not been taking aspirin and lipitor    Last echo - 7/30/2020 - EF 55%. , which was reviewed      COPD - On Spiriva and Advair(Tudorza?), has Albuterol PRN. Denies SOB, cough, sputum production. Paranoid schizophrenia - On Depakote 1500 mg nightly, Invega sustenna, provided by [facility] every 3-4 months. Old MR were reviewed. Denies recent hospitalizations, and claims taking meds as directed. Hypertension  Pertinent negatives include no chest pain or shortness of breath. Other  Pertinent negatives include no abdominal pain, arthralgias, chest pain, chills, coughing, fatigue, fever, myalgias, nausea, numbness, rash, vomiting or weakness. COPD  There is no cough or shortness of breath. Pertinent negatives include no chest pain, fever or myalgias.      Chronic diastolic CHF - systolic LV dysfunction is resolved on   meds      Medications    Current Outpatient Medications:     meloxicam (MOBIC) 15 MG tablet, , Disp: , Rfl:     aspirin 81 MG chewable tablet, Take 1 tablet by mouth daily, Disp: 30 tablet, Rfl: 5    potassium chloride (KLOR-CON M) 20 MEQ extended release tablet, TAKE 1 TABLET BY MOUTH DAILY. , Disp: 30 tablet, Rfl: 5    hydroCHLOROthiazide (HYDRODIURIL) 25 MG tablet, Take 1 tablet by mouth daily, Disp: 30 tablet, Rfl: 5    carvedilol (COREG) 25 MG tablet, TAKE 1 TABLET BY MOUTH TWICE A DAY, Disp: 180 tablet, Rfl: 2    tiotropium (Cleda Olp) 18 MCG inhalation capsule, Inhale 1 capsule into the lungs daily, Disp: 90 capsule, Rfl: 2    atorvastatin (LIPITOR) 20 MG tablet, Take 1 tablet by mouth daily, Disp: 90 tablet, Rfl: 0    docusate sodium (COLACE) 100 MG capsule, TAKE 1 CAPSULE BY MOUTH DAILY AS NEEDED, Disp: 28 capsule, Rfl: 3    albuterol sulfate HFA (PROVENTIL HFA) 108 (90 Base) MCG/ACT inhaler, Inhale 2 puffs into the lungs every 6 hours as needed for Wheezing or Shortness of Breath, Disp: 1 each, Rfl: 2    tamsulosin (FLOMAX) 0.4 MG capsule, Take 0.4 mg by mouth daily, Disp: , Rfl:     losartan (COZAAR) 100 MG tablet, Take 100 mg by mouth daily , Disp: , Rfl:     allopurinol (ZYLOPRIM) 300 MG tablet, Take 300 mg by mouth daily, Disp: , Rfl:     divalproex (DEPAKOTE ER) 500 MG ER tablet, Take 1,500 mg by mouth nightly, Disp: , Rfl:     paliperidone palmitate (INVEGA SUSTENNA) 156 MG/ML SUSP IM injection, Inject 156 mg into the muscle every 30 days. , Disp: , Rfl:     The patient has No Known Allergies. Past Medical History  Tony Barrow  has a past medical history of CAD (coronary artery disease), Cardiomyopathy ([address] Chest pain, CHF (congestive heart failure) ([address] COPD exacerbation ([address] Depression, Gout, Hyperlipidemia, Hypertension, Left ventricular dysfunction, and Schizoaffective disorder ([address] Past Surgical History  The patient  has a past surgical history that includes Appendectomy; Adenoidectomy; Tonsillectomy; Colonoscopy; and Colonoscopy (Left, 11/18/2019).     Family History  This patient's family history includes Arthritis in his mother. Social History  Ford Sherman  reports that he has been smoking cigarettes. He started smoking about 40 years ago. He has a 15.00 pack-year smoking history. He has never used smokeless tobacco. He reports current drug use. Drug: Marijuana Everardo Blow). He reports that he does not drink alcohol. Health Maintenance:    Health Maintenance   Topic Date Due    Pneumococcal 0-64 years Vaccine (1 - PCV) Never done    HIV screen  Never done    Hepatitis C screen  Never done    Shingles vaccine (1 of 2) Never done    COVID-19 Vaccine (4 - Booster for Moderna series) 04/06/2022    Flu vaccine (1) 09/01/2022    Depression Monitoring  02/16/2023    Lipids  08/23/2023    DTaP/Tdap/Td vaccine (2 - Td or Tdap) 06/03/2025    Colorectal Cancer Screen  11/18/2029    Hepatitis A vaccine  Aged Out    Hepatitis B vaccine  Aged Out    Hib vaccine  Aged Out    Meningococcal (ACWY) vaccine  Aged Out       Subjective:      Review of Systems   Constitutional:  Negative for chills, fatigue and fever. Respiratory:  Negative for cough and shortness of breath. Cardiovascular:  Negative for chest pain and leg swelling. Gastrointestinal:  Negative for abdominal pain, constipation, diarrhea, nausea and vomiting. Genitourinary:  Negative for difficulty urinating. Musculoskeletal:  Negative for arthralgias and myalgias. Skin:  Negative for rash and wound. Neurological:  Negative for tremors, weakness and numbness. Muscle cramps legs. Psychiatric/Behavioral:  Negative for agitation, confusion, dysphoric mood and hallucinations. The patient is not nervous/anxious. Objective: obese AF male not in distress today      /66 (Site: Right Upper Arm)   Pulse 72   Temp 97.1 °F (36.2 °C)   Ht 6' 2\" (1.88 m)   Wt 263 lb (119.3 kg)   SpO2 98%   BMI 33.77 kg/m²     Physical Exam  Vitals reviewed. Constitutional:       General: He is not in acute distress. Appearance: He is well-developed.  He is not diaphoretic. HENT:      Head: Normocephalic and atraumatic. Mouth/Throat:      Pharynx: No oropharyngeal exudate. Neck:      Thyroid: No thyromegaly. Cardiovascular:      Rate and Rhythm: Normal rate and regular rhythm. Heart sounds: Normal heart sounds. No murmur heard. No friction rub. No gallop. Pulmonary:      Effort: Pulmonary effort is normal. No respiratory distress. Breath sounds: Normal breath sounds. No wheezing or rales. Abdominal:      General: There is no distension. Palpations: Abdomen is soft. Tenderness: There is no abdominal tenderness. Musculoskeletal:         General: No tenderness. Normal range of motion. Cervical back: Normal range of motion and neck supple. Lymphadenopathy:      Cervical: No cervical adenopathy. Skin:     General: Skin is warm and dry. Coloration: Skin is not pale. Findings: No erythema or rash. Neurological:      Mental Status: He is alert and oriented to person, place, and time. Labs Reviewed 8/29/2022:      Recent labs from [facility] will be obtained. Lab Results   Component Value Date    WBC 7.7 01/04/2021    HGB 14.6 01/04/2021    HCT 45.0 01/04/2021     01/04/2021    CHOL 107 08/23/2022    TRIG 67 08/23/2022    HDL 33 08/23/2022    LDLDIRECT 97.38 01/04/2021    ALT 6 (L) 08/23/2022    AST 13 08/23/2022     08/23/2022    K 4.2 08/23/2022     08/23/2022    CREATININE 0.9 08/23/2022    BUN 13 08/23/2022    CO2 31 08/23/2022    TSH 1.320 03/16/2017    LABA1C 5.3 03/16/2017       Assessment/Plan      1. Cardiomyopathy resolved, hx of LV dysfunction resolved. Previous echo reviewed with pt , EF is normal at 55 %    No signs of decompensation. No cardiac s/s. On beta blocker, ARB. Weight stable overall,  no SOB, KING, orthopnea, chest pain. - aspirin 81 MG chewable tablet; Take 1 tablet by mouth daily  Dispense: 30 tablet; Refill: 5    2.  Essential hypertension  BP stable on current therapy. Continue. - EKG 12 Lead  - Magnesium; Future  - LDL Cholesterol, Direct; Future  - Comprehensive Metabolic Panel; Future  - CBC With Auto Differential; Future    3. High serum low density lipoprotein (LDL) cholesterol  Restart Lipitor. Recheck LDL with next labs. - Magnesium; Future  - LDL Cholesterol, Direct; Future  - Comprehensive Metabolic Panel; Future  - CBC With Auto Differential; Future  - atorvastatin (LIPITOR) 20 MG tablet; Take 1 tablet by mouth daily  Dispense: 30 tablet; Refill: 5      4. Smoking cessation - was highly recommended, due to risk factors. And spoke with him regarding weight loss of 5-10 lbs. He is up to date. Claims he smokes one Pack / day. On covid and influenza vaccine. I spoke with him re d/w his PCP re   ?? Sleep studies. RTO  6 months . , labs pre next visit.      Electronically signed by Ros Pathak MD on 8/29/2022 at 4:07 PM

## 2022-09-06 ENCOUNTER — HOSPITAL ENCOUNTER (OUTPATIENT)
Dept: GENERAL RADIOLOGY | Age: 57
Discharge: HOME | End: 2022-09-06
Payer: MEDICARE

## 2022-09-06 ENCOUNTER — HOSPITAL ENCOUNTER (OUTPATIENT)
Age: 57
Discharge: HOME | End: 2022-09-06
Payer: MEDICARE

## 2022-09-06 DIAGNOSIS — M16.11 PRIMARY OSTEOARTHRITIS OF RIGHT HIP: ICD-10-CM

## 2022-09-06 DIAGNOSIS — K59.00 CONSTIPATION, UNSPECIFIED CONSTIPATION TYPE: ICD-10-CM

## 2022-09-06 LAB — GFR SERPL CREATININE-BSD FRML MDRD: > 90 ML/MIN/1.73M2

## 2022-09-06 PROCEDURE — 72100 X-RAY EXAM L-S SPINE 2/3 VWS: CPT

## 2022-09-06 PROCEDURE — 73502 X-RAY EXAM HIP UNI 2-3 VIEWS: CPT

## 2022-09-06 RX ORDER — DOCUSATE SODIUM 100 MG/1
CAPSULE, LIQUID FILLED ORAL
Qty: 30 CAPSULE | Refills: 5 | Status: SHIPPED | OUTPATIENT
Start: 2022-09-06

## 2022-09-14 DIAGNOSIS — R79.89 HIGH SERUM LOW DENSITY LIPOPROTEIN (LDL) CHOLESTEROL: ICD-10-CM

## 2022-09-14 RX ORDER — ATORVASTATIN CALCIUM 20 MG/1
20 TABLET, FILM COATED ORAL DAILY
Qty: 90 TABLET | Refills: 1 | Status: SHIPPED | OUTPATIENT
Start: 2022-09-14

## 2023-01-06 ENCOUNTER — NURSE ONLY (OUTPATIENT)
Dept: LAB | Age: 58
End: 2023-01-06

## 2023-01-06 DIAGNOSIS — R79.89 HIGH SERUM LOW DENSITY LIPOPROTEIN (LDL) CHOLESTEROL: ICD-10-CM

## 2023-01-06 DIAGNOSIS — I10 ESSENTIAL HYPERTENSION: ICD-10-CM

## 2023-01-06 DIAGNOSIS — I42.9 CARDIOMYOPATHY, UNSPECIFIED TYPE (HCC): ICD-10-CM

## 2023-01-06 LAB
ALBUMIN SERPL-MCNC: 3.5 G/DL (ref 3.5–5.1)
ALP BLD-CCNC: 65 U/L (ref 38–126)
ALT SERPL-CCNC: < 5 U/L (ref 11–66)
ANION GAP SERPL CALCULATED.3IONS-SCNC: 11 MEQ/L (ref 8–16)
AST SERPL-CCNC: 10 U/L (ref 5–40)
BILIRUB SERPL-MCNC: 0.4 MG/DL (ref 0.3–1.2)
BILIRUBIN DIRECT: < 0.2 MG/DL (ref 0–0.3)
BUN BLDV-MCNC: 14 MG/DL (ref 7–22)
CALCIUM SERPL-MCNC: 8.9 MG/DL (ref 8.5–10.5)
CHLORIDE BLD-SCNC: 100 MEQ/L (ref 98–111)
CHOLESTEROL, TOTAL: 120 MG/DL (ref 100–199)
CO2: 28 MEQ/L (ref 23–33)
CREAT SERPL-MCNC: 1.1 MG/DL (ref 0.4–1.2)
GFR SERPL CREATININE-BSD FRML MDRD: > 60 ML/MIN/1.73M2
GLUCOSE BLD-MCNC: 85 MG/DL (ref 70–108)
HDLC SERPL-MCNC: 34 MG/DL
LDL CHOLESTEROL CALCULATED: 65 MG/DL
POTASSIUM SERPL-SCNC: 4.5 MEQ/L (ref 3.5–5.2)
SODIUM BLD-SCNC: 139 MEQ/L (ref 135–145)
TOTAL PROTEIN: 7 G/DL (ref 6.1–8)
TRIGL SERPL-MCNC: 105 MG/DL (ref 0–199)

## 2023-02-16 DIAGNOSIS — K59.00 CONSTIPATION, UNSPECIFIED CONSTIPATION TYPE: ICD-10-CM

## 2023-02-16 DIAGNOSIS — I10 ESSENTIAL HYPERTENSION: ICD-10-CM

## 2023-02-16 DIAGNOSIS — I42.9 CARDIOMYOPATHY, UNSPECIFIED TYPE (HCC): ICD-10-CM

## 2023-02-17 RX ORDER — DOCUSATE SODIUM 100 MG/1
CAPSULE, LIQUID FILLED ORAL
Qty: 28 CAPSULE | Refills: 3 | Status: SHIPPED | OUTPATIENT
Start: 2023-02-17

## 2023-02-17 RX ORDER — POTASSIUM CHLORIDE 20 MEQ/1
TABLET, EXTENDED RELEASE ORAL
Qty: 28 TABLET | Refills: 3 | Status: SHIPPED | OUTPATIENT
Start: 2023-02-17

## 2023-02-17 RX ORDER — ASPIRIN 81 MG
TABLET,CHEWABLE ORAL
Qty: 28 TABLET | Refills: 3 | Status: SHIPPED | OUTPATIENT
Start: 2023-02-17

## 2023-03-16 DIAGNOSIS — I10 ESSENTIAL HYPERTENSION: ICD-10-CM

## 2023-03-16 RX ORDER — HYDROCHLOROTHIAZIDE 25 MG/1
25 TABLET ORAL DAILY
Qty: 90 TABLET | Refills: 0 | Status: SHIPPED | OUTPATIENT
Start: 2023-03-16

## 2023-04-14 DIAGNOSIS — R79.89 HIGH SERUM LOW DENSITY LIPOPROTEIN (LDL) CHOLESTEROL: ICD-10-CM

## 2023-04-19 NOTE — TELEPHONE ENCOUNTER
Patient requesting refill     Last ordered 9/14/22, disp #90, refill 1    Date of last visit 8/29/22  Date of next visit 5/22

## 2023-04-20 RX ORDER — ATORVASTATIN CALCIUM 20 MG/1
20 TABLET, FILM COATED ORAL DAILY
Qty: 30 TABLET | Refills: 2 | Status: SHIPPED | OUTPATIENT
Start: 2023-04-20

## 2023-05-15 DIAGNOSIS — I50.32 CHRONIC DIASTOLIC CHF (CONGESTIVE HEART FAILURE) (HCC): ICD-10-CM

## 2023-05-15 RX ORDER — CARVEDILOL 25 MG/1
TABLET ORAL
Qty: 180 TABLET | Refills: 1 | Status: SHIPPED | OUTPATIENT
Start: 2023-05-15

## 2023-05-15 NOTE — TELEPHONE ENCOUNTER
Refill request received    Last ordered 8/29/22, disp 180, refill 2    Last visit 8/29/22  Next visit 5/22

## 2023-05-22 ENCOUNTER — OFFICE VISIT (OUTPATIENT)
Dept: INTERNAL MEDICINE CLINIC | Age: 58
End: 2023-05-22
Payer: MEDICARE

## 2023-05-22 VITALS
HEART RATE: 74 BPM | SYSTOLIC BLOOD PRESSURE: 134 MMHG | RESPIRATION RATE: 20 BRPM | BODY MASS INDEX: 33.13 KG/M2 | DIASTOLIC BLOOD PRESSURE: 88 MMHG | WEIGHT: 258 LBS | OXYGEN SATURATION: 96 % | TEMPERATURE: 98.1 F

## 2023-05-22 DIAGNOSIS — J44.9 CHRONIC OBSTRUCTIVE PULMONARY DISEASE, UNSPECIFIED COPD TYPE (HCC): ICD-10-CM

## 2023-05-22 DIAGNOSIS — I10 ESSENTIAL HYPERTENSION: ICD-10-CM

## 2023-05-22 DIAGNOSIS — R79.89 HIGH SERUM LOW DENSITY LIPOPROTEIN (LDL) CHOLESTEROL: ICD-10-CM

## 2023-05-22 DIAGNOSIS — I42.9 CARDIOMYOPATHY, UNSPECIFIED TYPE (HCC): ICD-10-CM

## 2023-05-22 DIAGNOSIS — K59.00 CONSTIPATION, UNSPECIFIED CONSTIPATION TYPE: ICD-10-CM

## 2023-05-22 PROCEDURE — 3075F SYST BP GE 130 - 139MM HG: CPT | Performed by: INTERNAL MEDICINE

## 2023-05-22 PROCEDURE — 99213 OFFICE O/P EST LOW 20 MIN: CPT | Performed by: INTERNAL MEDICINE

## 2023-05-22 PROCEDURE — 3079F DIAST BP 80-89 MM HG: CPT | Performed by: INTERNAL MEDICINE

## 2023-05-22 RX ORDER — ATORVASTATIN CALCIUM 20 MG/1
20 TABLET, FILM COATED ORAL DAILY
Qty: 30 TABLET | Refills: 2 | Status: SHIPPED | OUTPATIENT
Start: 2023-05-22

## 2023-05-22 RX ORDER — ALBUTEROL SULFATE 90 UG/1
2 AEROSOL, METERED RESPIRATORY (INHALATION) EVERY 6 HOURS PRN
Qty: 1 EACH | Refills: 2 | Status: SHIPPED | OUTPATIENT
Start: 2023-05-22

## 2023-05-22 RX ORDER — POTASSIUM CHLORIDE 20 MEQ/1
20 TABLET, EXTENDED RELEASE ORAL DAILY
Qty: 28 TABLET | Refills: 3 | Status: SHIPPED | OUTPATIENT
Start: 2023-05-22

## 2023-05-22 RX ORDER — PALIPERIDONE PALMITATE 156 MG/ML
INJECTION INTRAMUSCULAR
COMMUNITY
Start: 2023-04-24 | End: 2023-05-22

## 2023-05-22 RX ORDER — TIOTROPIUM BROMIDE 18 UG/1
18 CAPSULE ORAL; RESPIRATORY (INHALATION) DAILY
Qty: 90 CAPSULE | Refills: 2 | Status: SHIPPED | OUTPATIENT
Start: 2023-05-22

## 2023-05-22 RX ORDER — ASPIRIN 81 MG/1
TABLET, CHEWABLE ORAL
Qty: 28 TABLET | Refills: 3 | Status: SHIPPED | OUTPATIENT
Start: 2023-05-22

## 2023-05-22 RX ORDER — DOCUSATE SODIUM 100 MG/1
CAPSULE, LIQUID FILLED ORAL
Qty: 28 CAPSULE | Refills: 3 | Status: SHIPPED | OUTPATIENT
Start: 2023-05-22

## 2023-05-22 SDOH — ECONOMIC STABILITY: FOOD INSECURITY: WITHIN THE PAST 12 MONTHS, YOU WORRIED THAT YOUR FOOD WOULD RUN OUT BEFORE YOU GOT MONEY TO BUY MORE.: NEVER TRUE

## 2023-05-22 SDOH — ECONOMIC STABILITY: INCOME INSECURITY: HOW HARD IS IT FOR YOU TO PAY FOR THE VERY BASICS LIKE FOOD, HOUSING, MEDICAL CARE, AND HEATING?: NOT VERY HARD

## 2023-05-22 SDOH — ECONOMIC STABILITY: FOOD INSECURITY: WITHIN THE PAST 12 MONTHS, THE FOOD YOU BOUGHT JUST DIDN'T LAST AND YOU DIDN'T HAVE MONEY TO GET MORE.: NEVER TRUE

## 2023-05-22 SDOH — ECONOMIC STABILITY: HOUSING INSECURITY
IN THE LAST 12 MONTHS, WAS THERE A TIME WHEN YOU DID NOT HAVE A STEADY PLACE TO SLEEP OR SLEPT IN A SHELTER (INCLUDING NOW)?: NO

## 2023-05-22 ASSESSMENT — PATIENT HEALTH QUESTIONNAIRE - PHQ9
5. POOR APPETITE OR OVEREATING: 0
4. FEELING TIRED OR HAVING LITTLE ENERGY: 0
9. THOUGHTS THAT YOU WOULD BE BETTER OFF DEAD, OR OF HURTING YOURSELF: 0
6. FEELING BAD ABOUT YOURSELF - OR THAT YOU ARE A FAILURE OR HAVE LET YOURSELF OR YOUR FAMILY DOWN: 0
1. LITTLE INTEREST OR PLEASURE IN DOING THINGS: 0
SUM OF ALL RESPONSES TO PHQ QUESTIONS 1-9: 0
SUM OF ALL RESPONSES TO PHQ QUESTIONS 1-9: 0
7. TROUBLE CONCENTRATING ON THINGS, SUCH AS READING THE NEWSPAPER OR WATCHING TELEVISION: 0
SUM OF ALL RESPONSES TO PHQ9 QUESTIONS 1 & 2: 0
8. MOVING OR SPEAKING SO SLOWLY THAT OTHER PEOPLE COULD HAVE NOTICED. OR THE OPPOSITE, BEING SO FIGETY OR RESTLESS THAT YOU HAVE BEEN MOVING AROUND A LOT MORE THAN USUAL: 0
10. IF YOU CHECKED OFF ANY PROBLEMS, HOW DIFFICULT HAVE THESE PROBLEMS MADE IT FOR YOU TO DO YOUR WORK, TAKE CARE OF THINGS AT HOME, OR GET ALONG WITH OTHER PEOPLE: 0
SUM OF ALL RESPONSES TO PHQ QUESTIONS 1-9: 0
2. FEELING DOWN, DEPRESSED OR HOPELESS: 0
3. TROUBLE FALLING OR STAYING ASLEEP: 0
SUM OF ALL RESPONSES TO PHQ QUESTIONS 1-9: 0

## 2023-05-22 ASSESSMENT — COPD QUESTIONNAIRES: COPD: 1

## 2023-05-22 ASSESSMENT — ENCOUNTER SYMPTOMS
SHORTNESS OF BREATH: 0
VOMITING: 0
DIARRHEA: 0
ABDOMINAL PAIN: 0
CONSTIPATION: 0
COUGH: 0
NAUSEA: 0

## 2023-05-22 NOTE — PROGRESS NOTES
Cassandra Wolf  INTERNAL MEDICINE  [address] [facility] [address]  Dept: [phone]  Dept Fax: [phone] : [phone]     Visit Date:  5/22/2023    Patient:  Rashmi Egan  YOB: 1965    HPI:     Chief Complaint   Patient presents with    Hypertension    Hyperlipidemia       PCP - Jessica Turner       Obese pt , pleasant AF male not in distress. , hx of CHF denies cardiac stents. NO LOC or any bleeding issues. , NO PND or orthopnea. Unfortunately still smoking about a pack per day,  plans on quitting wants to use nicotine pathces. HTN - BP is . Stable on HCTZ 25 mg daily, Losartan 100 mg daily, Coreg 25 mg BID    CMP - On Coreg 25 mg BID, Losartan 100 mg daily, potassium 20 meq daily. EKG today SR, one PVD noted, rate 86, wide  ms,  ms, QTc 411, . Denies SOB, KING, orthopnea, chest pain. Tolerating activity without difficulty. Has not been taking aspirin and lipitor    Last echo - 7/30/2020 - EF 55%. , which was reviewed      COPD - On Spiriva and Advair(Tudorza?), has Albuterol PRN. Denies SOB, cough, sputum production. Paranoid schizophrenia - On Depakote 1500 mg nightly, Invega sustenna, provided by [facility] every 3-4 months. Old MR were reviewed. Overall doing well, no recent hosptializations, got his annual flu shot. Hypertension  Pertinent negatives include no chest pain or shortness of breath. Other  Pertinent negatives include no abdominal pain, arthralgias, chest pain, chills, coughing, fatigue, fever, myalgias, nausea, numbness, rash, vomiting or weakness. COPD  There is no cough or shortness of breath. Pertinent negatives include no chest pain, fever or myalgias. Hyperlipidemia  Pertinent negatives include no chest pain, myalgias or shortness of breath.      Chronic diastolic CHF - systolic LV dysfunction is resolved on   meds      Medications    Current Outpatient

## 2023-05-26 ENCOUNTER — CARE COORDINATION (OUTPATIENT)
Dept: CARE COORDINATION | Age: 58
End: 2023-05-26

## 2023-05-26 ASSESSMENT — ENCOUNTER SYMPTOMS: DYSPNEA ASSOCIATED WITH: EXERTION

## 2023-05-26 NOTE — CARE COORDINATION
Remote Patient Kit Ordering Note      Date/Time:  5/26/2023 10:14 AM      [x] [facility] confirmed patient shipping address  [x] Patient will receive package over the next 2-4 business days. Someone 21 years or older must be present to sign for UPS delivery. [x] Patient to contact virtual installation-specific phone number listed in the patient instructions. [x] If the patient does not contact HRS within 24 hours, an Novavax0 [facility] will call the patient directly: If the patient does not answer, HRS will follow up with the clinical team notifying them about the unsuccessful attempt to contact the patient. HRS will make three call attempts to the patient. [x] ACM will contact patient once equipment is active to welcome them to the program.                                                         [x] Hours of RPM monitoring - Monday-Friday [id number]                     All questions answered at this time. ACM made aware the RPM kit has been ordered. [facility] notified patient of RPM equipment order.

## 2023-06-02 ENCOUNTER — CARE COORDINATION (OUTPATIENT)
Dept: CARE COORDINATION | Age: 58
End: 2023-06-02

## 2023-06-05 ENCOUNTER — CARE COORDINATION (OUTPATIENT)
Dept: CARE COORDINATION | Age: 58
End: 2023-06-05

## 2023-06-06 DIAGNOSIS — I10 ESSENTIAL HYPERTENSION: ICD-10-CM

## 2023-06-07 ENCOUNTER — CARE COORDINATION (OUTPATIENT)
Dept: CARE COORDINATION | Age: 58
End: 2023-06-07

## 2023-06-07 RX ORDER — HYDROCHLOROTHIAZIDE 25 MG/1
25 TABLET ORAL DAILY
Qty: 90 TABLET | Refills: 1 | Status: SHIPPED | OUTPATIENT
Start: 2023-06-07

## 2023-06-07 NOTE — CARE COORDINATION
apparent distress, speaking in full sentences. Patient states the scale is not working correctly because he normally weighs around 260lbs. Writer verified this in recent doctor notes. [facility] tech support notified and is going to reach out to patient to assess. Will continue to monitor and update ACM. Plan/Follow Up: Will continue to review, monitor and address alerts with follow up based on severity of symptoms and risk factors.     Chandler Costello LPN  [facility]/ CTN/ Remote Patient monitoring  [phone]

## 2023-06-09 ENCOUNTER — CARE COORDINATION (OUTPATIENT)
Dept: CARE COORDINATION | Age: 58
End: 2023-06-09

## 2023-06-09 ASSESSMENT — ENCOUNTER SYMPTOMS: DYSPNEA ASSOCIATED WITH: EXERTION

## 2023-06-09 NOTE — CARE COORDINATION
Ambulatory Care Coordination Note  2023    Patient Current Location:  Home: [address] [place] [address]  [address]  [address] [address]     ACM contacted the patient by telephone. Verified name and  with patient as identifiers. Provided introduction to self, and explanation of the ACM role. Challenges to be reviewed by the provider   Additional needs identified to be addressed with provider: No  none               Method of communication with provider: none. ACM: Elizabeth Barker RN     Waleska Wang was referred to care coordination by education and assistance in managing his chronic conditions and healthcare needs. Spoke with Waleska Wang today. Pt was referred for RPM by Dr. Oscar Vazquez. Pt has h/o: CHF, COPD, HLD, HTN, schizophrenia. Spoke with Waleska Wang today for f/u. Pt reports that he is doing well. COPD: still only using Spiriva PRN. Reminded pt that this is a maintenance inhaler and it needs to be used daily. Pt verbalizes understanding. Reports breathing is at baseline. Educated on difference between maintenance and rescue inhaler. No new cough or congestion. Pt is actively smoking. Smoking a few cigarettes per day. CHF: pt now monitoring wts daily. Reports wts have been stable last couple of days. Current wt today 263.0#.   On HCTZ. Denies edema or increased SOB. Schizophrenia-follows with Jenny Spivey CNP at [facility]. Gets Invega injection mthly at facility. Welcome call completed for RPM    Offered patient enrollment in the Remote Patient Monitoring (RPM) program for in-home monitoring:  pt monitoring VS's. Scale was not reading correctly initially. HRS assisted in resetting scale. Pt reports that since then he is getting correct wts. Wt is not registering in the system. I attempted to call pt back to advise him to call HRS but I did not get an answer. .pt weighed self during call today.    VS: wt 263#, 110/70-82-95% RA  Plan of care:  Continue participating in RPM.  Pt will need to contact technical

## 2023-06-19 ENCOUNTER — CARE COORDINATION (OUTPATIENT)
Dept: CARE COORDINATION | Age: 58
End: 2023-06-19

## 2023-06-19 ASSESSMENT — ENCOUNTER SYMPTOMS: DYSPNEA ASSOCIATED WITH: EXERTION

## 2023-06-19 NOTE — CARE COORDINATION
Ambulatory Care Coordination Note  2023    Patient Current Location:  Home: [address] [place] [address]  [address] [address]     ACM contacted the patient by telephone. Verified name and  with patient as identifiers. Provided introduction to self, and explanation of the ACM role. Challenges to be reviewed by the provider   Additional needs identified to be addressed with provider: No  none               Method of communication with provider: none. ACM: Kleber Knight RN    Gearl Jhony was referred to care coordination by education and assistance in managing his chronic conditions and healthcare needs. Spoke with [email or web address] today. Pt was referred for RPM by Dr. Ilan Milian. Pt has h/o: CHF, COPD, HLD, HTN, schizophrenia. Spoke with [email or web address] today. Pt reports that he is doing well. COPD: breathing at baseline. No new cough or congestion  CHF: monitoring wts daily. Not recording. Pt is going to call customer support  Denies edema to ext's or increased SOB. Schizophrenia-follows with Erin Lewis CNP at [facility]. Gets Invega injection mthly at facility. Offered patient enrollment in the Remote Patient Monitoring (RPM) program for in-home monitoring: Yes, patient already enrolled. Weight not recording. Provided pt with customer support and advised him to call. VS's: 116/60-81-95% RA -wt 261#.     Congestive Heart Failure Assessment    Do you understand a low sodium diet?: Yes  Do you understand how to read food labels?: Yes         Symptoms:  CHF associated angina: Neg, CHF associated dyspnea on exertion: Neg, CHF associated fatigue: Neg, CHF associated leg swelling: Neg, CHF associated orthostatic hypotension: Neg, CHF associated PND: Neg, CHF associated shortness of breath: Neg, CHF associated weakness: Neg      Symptom course: stable  Patient-reported weight (lb): 261  Weight trend: fluctuating minimally  Salt intake watch compared to last visit: stable      and   COPD Assessment    Does the patient

## 2023-06-20 ENCOUNTER — CARE COORDINATION (OUTPATIENT)
Dept: CARE COORDINATION | Age: 58
End: 2023-06-20

## 2023-06-20 NOTE — CARE COORDINATION
Remote Patient Monitoring Note      Date/Time:  2023 3:44 PM  Patient Current Location: Home: [address] [place] [address] [address]    LPN contacted patient by telephone regarding yellow alert received for no weight metrics for >2 days. Verified patients name and  as identifiers. Background: High Blood-Pressure, COPD, CHF    Clinical Interventions:  Patient states Tech Support did reach out to him and they requested patient call back when his sister is there to assist him. Patient states that should be today or tomorrow. Writer manually entered weight for patient today. Plan/Follow Up: Will continue to review, monitor and address alerts with follow up based on severity of symptoms and risk factors.        Sravan Fernandez LPN  [facility]/ CTN/ Remote Patient Monitoring  [phone]

## 2023-06-23 ENCOUNTER — CARE COORDINATION (OUTPATIENT)
Dept: CASE MANAGEMENT | Age: 58
End: 2023-06-23

## 2023-06-23 NOTE — CARE COORDINATION
Remote Alert Monitoring Note    Challenges to be reviewed by the provider   Additional needs identified to be addressed with provider No                Date/Time:  2023 8:14 AM  Patient Current Location: [facility]    LPN contacted patient by telephone regarding red alert received for blood pressure reading (135/124). Verified patients name and  as identifiers. Background: HTN, COPD, CHF  Refer to 911 immediately if:  Patient unresponsive or unable to provide history  Change in cognition or sudden confusion  Patient unable to respond in complete sentences  Intense chest pain/tightness  Any concern for any clinical emergency  Red Alert: Provider response time of 1 hr required for any red alert requiring intervention  Yellow Alert: Provider response time of 3hr required for any escalated yellow alert    BP Triage  Are you having any Chest Pain? no   Are you having any Shortness of Breath? no   Do you have a headache or have any vision changes? no   Are you having any numbness or tingling? no   Are you having any other health concerns or issues? no       Clinical Interventions: Reviewed and followed up on alerts and treatments-Patient has elevated BP of 135/124. Denies CP, SOB, Stroke-like Symptoms, Swelling and increased NA intake. Patient has taken morning medications earlier today before taking BP including Coreg, HCTZ, Losartan. No Oxygen used at home. Patient rechecked BP at 116/66. Patient hasn't been weighing since scale needs readjusted and his sister is out of town and will come over and check it out. No further action needed at this time. Education of patient/family/caregiver/guardian to support self-management-Recheck BP  Instructed to place BP cuff on upper arm snuggly. Sit with feet flat on the floor. Sit quietly and relax for 5 minutes before taking BP.     Advised Patient to contact PCP  regarding CP, SOB, Stroke-like Symptoms and any health concerns for early outpatient intervention in

## 2023-06-26 ENCOUNTER — CARE COORDINATION (OUTPATIENT)
Dept: CASE MANAGEMENT | Age: 58
End: 2023-06-26

## 2023-06-27 ENCOUNTER — CARE COORDINATION (OUTPATIENT)
Dept: CASE MANAGEMENT | Age: 58
End: 2023-06-27

## 2023-06-28 ENCOUNTER — CARE COORDINATION (OUTPATIENT)
Dept: CARE COORDINATION | Age: 58
End: 2023-06-28

## 2023-06-28 ENCOUNTER — CARE COORDINATION (OUTPATIENT)
Dept: CASE MANAGEMENT | Age: 58
End: 2023-06-28

## 2023-06-28 ASSESSMENT — ENCOUNTER SYMPTOMS: DYSPNEA ASSOCIATED WITH: EXERTION

## 2023-06-29 ENCOUNTER — CARE COORDINATION (OUTPATIENT)
Dept: CASE MANAGEMENT | Age: 58
End: 2023-06-29

## 2023-06-30 ENCOUNTER — CARE COORDINATION (OUTPATIENT)
Dept: CASE MANAGEMENT | Age: 58
End: 2023-06-30

## 2023-07-06 ENCOUNTER — CARE COORDINATION (OUTPATIENT)
Dept: CARE COORDINATION | Age: 58
End: 2023-07-06

## 2023-07-06 NOTE — CARE COORDINATION
Ambulatory Care Coordination Note  2023    Patient Current Location:  Home: [address] [place] [address]     Annie RIVERO contacted the patient by telephone. Verified name and  with patient as identifiers. Provided introduction to self, and explanation of the GIANA RIVERO role. Challenges to be reviewed by the provider   Additional needs identified to be addressed with provider: No  none               Method of communication with provider: none. I spoke with the patient for continued Care Coordination follow up and education. Patient states he is doing good. Breathing is at baseline. Denies increased SOB, cough or CP. No edema. We discussed Zone management tools for chronic disease(s) and patient denies current questions re: s/sx at this time. Educated on how to identify sx's that are worse than the baseline and the importance of early symptom recognition and reporting to prevent exacerbation which may lead to ED visits and hospital admissions. I advised patient to contact PCP office if needed. No further needs at this time.        Lab Results       None            Care Coordination Interventions    Referral from Primary Care Provider: No  Suggested Interventions and Community Resources  Medi Set or Pill Pack: Completed (Comment: pt gets mediset through [address])  Smoking Cessation: Declined  Zone Management Tools: Completed          Goals Addressed    None         Future Appointments   Date Time Provider [address]   2023  8:15 AM Nila Watts MD [facility] Physic JODY  Leif

## 2023-07-10 ENCOUNTER — CARE COORDINATION (OUTPATIENT)
Dept: CASE MANAGEMENT | Age: 58
End: 2023-07-10

## 2023-07-10 NOTE — CARE COORDINATION
Remote Alert Monitoring Note  Rpm alert to be reviewed by the provider   red alert   weight (gain of 7# in 7 days)   Additional needs to be addressed by N/A: No                    Date/Time:  7/10/2023 9:06 AM  Patient Current Location: [place]  LPN contacted patient by telephone regarding red alert received for weight increase (7# in 7 days  261 to 268). Verified patients name and  as identifiers. Attempted to reach patient X 2 for RPM Red Alert Call. Unable to reach patient. No answer  Attempted to Call ER contact - Voice Mail Not set up at this time - Unable to leave message   Sent Message to [employer]. Will continue to follow. Sondra Carrion LPN    [phone]  [facility] Coordinator      Background: HTN, COPD, CHF  Refer to 911 immediately if:  Patient unresponsive or unable to provide history  Change in cognition or sudden confusion  Patient unable to respond in complete sentences  Intense chest pain/tightness  Any concern for any clinical emergency  Red Alert: Provider response time of 1 hr required for any red alert requiring intervention  Yellow Alert: Provider response time of 3hr required for any escalated yellow alert  Plan/Follow Up: Will continue to review, monitor and address alerts with follow up based on severity of symptoms and risk factors.     Current Patient Metrics ---- Blood Pressure: 99/63, 88bpm Pulseox: 95%, 91bpm Survey: - Weight: 268.0lbs Note Created at: 07/10/2023 03:55 PM ET ---- Time-Spent: 6 minutes 0 seconds

## 2023-07-11 ENCOUNTER — CARE COORDINATION (OUTPATIENT)
Dept: CARE COORDINATION | Facility: CLINIC | Age: 58
End: 2023-07-11

## 2023-07-11 NOTE — CARE COORDINATION
Remote Alert Monitoring Note  Rpm alert to be reviewed by the provider   red alert   weight (7.5 lb gain in last 7 days)   Additional needs to be addressed by  TBD : No                    Date/Time:  2023 10:23 AM  Patient Current Location: Home: [address] [place] [address]  Memo Jesus  LPN contacted patient by telephone regarding red alert received for weight increase (7.5 lbs in last 7 days). Verified patients name and  as identifiers. Background: Pt enrolled in RPM r/t HTN, COPD, and CHF  Refer to 911 immediately if:  Patient unresponsive or unable to provide history  Change in cognition or sudden confusion  Patient unable to respond in complete sentences  Intense chest pain/tightness  Any concern for any clinical emergency  Red Alert: Provider response time of 1 hr required for any red alert requiring intervention  Yellow Alert: Provider response time of 3hr required for any escalated yellow alert    Weight Triage  Are you weighing any different than you did yesterday? (time of day, clothes and shoes on or off, etc)? no   Do you have any shortness of breath? no   Do you have any swelling in your hands of feet? no   Are you having any other health concerns or issues? no   Have you taken your medications as instructed by your doctor today? yes    Clinical Interventions: Reviewed and followed up on alerts and treatments-Wt increase noted; 261.0 lbs to 268.5 lbs in last 7 days. Pt denied weighing self any differently, denied SOB, swelling, and acute distress. Pt asymptomatic at this time. Requested pt reweigh at this time with updated wt of 268.0 lbs noted. Escalated alert to RN-Update provided; awaiting response  Education of patient/family/caregiver/guardian to support self-management-Pt v/u of when to contact PCP and when to seek medical care    Plan/Follow Up: Will continue to review, monitor and address alerts with follow up based on severity of symptoms and risk factors.

## 2023-07-11 NOTE — TELEPHONE ENCOUNTER
I spoke with pt. Verified with pt that he is not having any concerning s/s. Denies increased SOB. Denies swelling. Pt does not feel he has been consuming more sodium than normal but admits that he has been drinking more than 2 liters per day. Encouraged to reduce back to 2 liters or less daily and to monitor sodium consumption closely. Pt reports compliance with medications. Reports that he has been voiding large amts of urine. Will continue to monitor at this time.

## 2023-07-20 ENCOUNTER — CARE COORDINATION (OUTPATIENT)
Dept: CASE MANAGEMENT | Age: 58
End: 2023-07-20

## 2023-07-20 NOTE — CARE COORDINATION
Remote Alert Monitoring Note  Rpm alert to be reviewed by the provider   red alert   weight (Gain 3# over night)   Additional needs to be addressed by N/A: No                    Date/Time:  2023 9:11 AM  Patient Current Location: [facility] contacted patient by telephone regarding red alert received for weight increase (3# over night). Verified patients name and  as identifiers. Background: HTN, COPD, CHF  Refer to 911 immediately if:  Patient unresponsive or unable to provide history  Change in cognition or sudden confusion  Patient unable to respond in complete sentences  Intense chest pain/tightness  Any concern for any clinical emergency  Red Alert: Provider response time of 1 hr required for any red alert requiring intervention  Yellow Alert: Provider response time of 3hr required for any escalated yellow alert    Weight Scale Triage  Was your weight obtained upon rising/waking today? Yes   Was your weight obtained after voiding and/or use of the bathroom today? yes   Did you weigh yourself in the same amount of clothing today, compared to how you typically do? yes   Was the scale bumped or moved prior to today's weight? no   Is your scale on a flat/hard surface? yes   Did you obtain your weight with shoes on? no   If yes, is this something you normally do during your daily weights? no   Were you standing up straight on the scale today? yes   Were you leaning on anything while obtaining your weight today? no     Clinical Interventions: Reviewed and followed up on alerts and treatments-Patient has 3# weight gain over night. Denies CP, SOB, Swelling, Increased Na intake or scale issues. Patient has taken morning medications including HCTZ 25 MG daily and KcL 20 MEq daily. Will continue to Monitor weight since patient is asymptomatic.      Education of patient/family/caregiver/guardian to support self-management-Reviewed Weighing instructions    Instructed patient on the importance of weighing daily, in

## 2023-07-21 ENCOUNTER — CARE COORDINATION (OUTPATIENT)
Dept: CASE MANAGEMENT | Age: 58
End: 2023-07-21

## 2023-07-21 NOTE — CARE COORDINATION
Remote Alert Monitoring Note  Rpm alert to be reviewed by the provider   red alert   pulse ox reading (82%)   Additional needs to be addressed by N/A: No                    Date/Time:  2023 9:28 AM  Patient Current Location: [facility] contacted patient by telephone regarding red alert received for pulse ox reading (82%). Verified patients name and  as identifiers. Background: HTN, COPD, CHF  Refer to 911 immediately if:  Patient unresponsive or unable to provide history  Change in cognition or sudden confusion  Patient unable to respond in complete sentences  Intense chest pain/tightness  Any concern for any clinical emergency  Red Alert: Provider response time of 1 hr required for any red alert requiring intervention  Yellow Alert: Provider response time of 3hr required for any escalated yellow alert    O2 Triage  Are you having any Chest Pain? no   Are you having any Shortness of Breath? no   Swelling in your hands or feet? no     Are you having any other health concerns or issues? no     Clinical Interventions: Reviewed and followed up on alerts and treatments-Patient has low SpO2 level of 82%. Denies CP, SOB, No Oxygen used at home, No swelling hands or feet. Patient uses inhalers as directed. Rechecked SpO2 level for 95%. No further action needed. Education of patient/family/caregiver/guardian to support self-management-Recheck SpO2 level  Have warm hands, hold hands and fingers very still while testing SpO2 level. Take a few deep breaths before testing. Advised Patient to contact PCP  regarding any health concerns for early outpatient intervention in an effort to avoid hospitalization. Report any worsening symptoms to PCP and/or Call 911 and/or GO TO  EMERGENCY ROOM if symptoms are severe or worsening. Expresses understanding. Plan/Follow Up: Will continue to review, monitor and address alerts with follow up based on severity of symptoms and risk factors.      Current Patient

## 2023-07-24 ENCOUNTER — CARE COORDINATION (OUTPATIENT)
Dept: CARE COORDINATION | Age: 58
End: 2023-07-24

## 2023-07-24 SDOH — HEALTH STABILITY: PHYSICAL HEALTH: ON AVERAGE, HOW MANY MINUTES DO YOU ENGAGE IN EXERCISE AT THIS LEVEL?: 10 MIN

## 2023-07-24 SDOH — HEALTH STABILITY: PHYSICAL HEALTH: ON AVERAGE, HOW MANY DAYS PER WEEK DO YOU ENGAGE IN MODERATE TO STRENUOUS EXERCISE (LIKE A BRISK WALK)?: 3 DAYS

## 2023-07-24 SDOH — ECONOMIC STABILITY: HOUSING INSECURITY: IN THE LAST 12 MONTHS, HOW MANY PLACES HAVE YOU LIVED?: 1

## 2023-07-24 SDOH — ECONOMIC STABILITY: INCOME INSECURITY: IN THE LAST 12 MONTHS, WAS THERE A TIME WHEN YOU WERE NOT ABLE TO PAY THE MORTGAGE OR RENT ON TIME?: NO

## 2023-07-24 ASSESSMENT — SOCIAL DETERMINANTS OF HEALTH (SDOH)
IN A TYPICAL WEEK, HOW MANY TIMES DO YOU TALK ON THE PHONE WITH FAMILY, FRIENDS, OR NEIGHBORS?: MORE THAN THREE TIMES A WEEK
HOW OFTEN DO YOU GET TOGETHER WITH FRIENDS OR RELATIVES?: MORE THAN THREE TIMES A WEEK
HOW OFTEN DO YOU ATTEND CHURCH OR RELIGIOUS SERVICES?: NEVER
HOW OFTEN DO YOU ATTENT MEETINGS OF THE CLUB OR ORGANIZATION YOU BELONG TO?: NEVER
DO YOU BELONG TO ANY CLUBS OR ORGANIZATIONS SUCH AS CHURCH GROUPS UNIONS, FRATERNAL OR ATHLETIC GROUPS, OR SCHOOL GROUPS?: NO

## 2023-07-24 ASSESSMENT — LIFESTYLE VARIABLES
HOW MANY STANDARD DRINKS CONTAINING ALCOHOL DO YOU HAVE ON A TYPICAL DAY: 3 OR 4
HOW OFTEN DO YOU HAVE A DRINK CONTAINING ALCOHOL: MONTHLY OR LESS

## 2023-07-24 ASSESSMENT — ENCOUNTER SYMPTOMS: DYSPNEA ASSOCIATED WITH: EXERTION

## 2023-07-24 NOTE — CARE COORDINATION
Ambulatory Care Coordination Note  2023    Patient Current Location:  Home: [address] [place] [address]  [address]  [address] [facility] [facility] [place] [phone]     ACM contacted the patient by telephone. Verified name and  with patient as identifiers. Provided introduction to self, and explanation of the ACM role. Challenges to be reviewed by the provider   Additional needs identified to be addressed with provider: No  none               Method of communication with provider: none. ACM: Aaliyah Manzano RN    Carine Luis was referred to care coordination by education and assistance in managing his chronic conditions and healthcare needs. Spoke with Carine Smith today. Pt was referred for RPM by Dr. Yudith Snyder. Pt has h/o: CHF, COPD, HLD, HTN, schizophrenia. Offered patient enrollment in the Remote Patient Monitoring (RPM) program for in-home monitoring:  pt enrolled. VS's have been stable for last few days. .  135/84-80-95% RA-264.5#.   CHF: wts remains stable. No new cough or congestion. Breathing remains at baseline. Monitoring wts daily. Denies edema. Following low sodium diet. Reinforced early symptom recognition and reporting. COPD: breathing at baseline. No new cough or congestion. Plan of care:  Reviewed upcoming appts. Pt has appt with Dr. Yudith Snyder in Nov and Dr. Franklyn Beatty in Dec.   Continues to f/u with [facility] for [facility]. Encouraged to continue participating in RPM  Reinforce low sodium diet.    Reinforce COPD, CHF zones each call  Congestive Heart Failure Assessment    Do you understand a low sodium diet?: Yes  Do you understand how to read food labels?: Yes         Symptoms:  CHF associated angina: Neg, CHF associated dyspnea on exertion: Pos, CHF associated fatigue: Neg, CHF associated leg swelling: Neg, CHF associated orthostatic hypotension: Neg, CHF associated PND: Neg, CHF associated shortness of breath: Neg, CHF associated weakness: Neg      Symptom course: stable  Patient-reported weight (lb): 264.5  Weight

## 2023-07-26 ENCOUNTER — CARE COORDINATION (OUTPATIENT)
Dept: CASE MANAGEMENT | Age: 58
End: 2023-07-26

## 2023-07-26 NOTE — CARE COORDINATION
Remote Alert Monitoring Note  Rpm alert to be reviewed by the provider   red alert   pulse ox reading (84%)   Additional needs to be addressed by N/A: No                    Date/Time:  7/26/2023 8:30 AM  Patient Current Location: [place]  LPN attempted to contacted patient by telephone. Attempted to reach patient X 2, ER Contact and other ER Contact Home Number for RPM Red Alert Call. Unable to reach patient. No answer and Voice mail not set up. ACM Notified  Will continue to follow. Louie Osman LPN    [phone]  [facility] Coordinator        Background: HTN, COPD, CHF  Refer to 911 immediately if:  Patient unresponsive or unable to provide history  Change in cognition or sudden confusion  Patient unable to respond in complete sentences  Intense chest pain/tightness  Any concern for any clinical emergency  Red Alert: Provider response time of 1 hr required for any red alert requiring intervention  Yellow Alert: Provider response time of 3hr required for any escalated yellow alert  Plan/Follow Up: Will continue to review, monitor and address alerts with follow up based on severity of symptoms and risk factors.     Current Patient Metrics ---- Blood Pressure: 112/67, 74bpm Pulseox: 84%, 81bpm Survey: - Weight: 266.0lbs Note Created at: 07/26/2023 03:45 PM ET ---- Time-Spent: 8 minutes 0 seconds

## 2023-07-28 ENCOUNTER — CARE COORDINATION (OUTPATIENT)
Dept: CASE MANAGEMENT | Age: 58
End: 2023-07-28

## 2023-07-28 NOTE — CARE COORDINATION
SpO2 Level. Education of patient/family/caregiver/guardian to support self-management-Recheck SpO2 level  Have warm hands, hold hands and fingers very still while testing SpO2 level. Take a few deep breaths before testing. Advised Patient to contact PCP 24/7 regarding any health concerns for early outpatient intervention in an effort to avoid hospitalization. Report any worsening symptoms to PCP and/or Call 911 and/or GO TO  EMERGENCY ROOM if symptoms are severe or worsening. Expresses understanding. Plan/Follow Up: Will continue to review, monitor and address alerts with follow up based on severity of symptoms and risk factors.     Current Patient Metrics ---- Blood Pressure: 110/100, 80bpm Pulseox: 80%, 84bpm Survey: - Weight: 259.0lbs Note Created at: 07/28/2023 01:50 PM ET ---- Time-Spent: 15 minutes 0 seconds

## 2023-07-31 ENCOUNTER — CARE COORDINATION (OUTPATIENT)
Dept: CASE MANAGEMENT | Age: 58
End: 2023-07-31

## 2023-07-31 NOTE — CARE COORDINATION
Remote Alert Monitoring Note  Rpm alert to be reviewed by the provider   red alert   pulse ox reading (90)   Additional needs to be addressed by N/A: No                    Date/Time:  2023 8:22 AM  Patient Current Location: [facility] contacted patient by telephone. Verified patients name and  as identifiers. Background: HTN. CHF, COPD  Refer to 911 immediately if:  Patient unresponsive or unable to provide history  Change in cognition or sudden confusion  Patient unable to respond in complete sentences  Intense chest pain/tightness  Any concern for any clinical emergency  Red Alert: Provider response time of 1 hr required for any red alert requiring intervention  Yellow Alert: Provider response time of 3hr required for any escalated yellow alert    O2 Triage  Are you having any Chest Pain? no   Are you having any Shortness of Breath? no   Swelling in your hands or feet? no     Are you having any other health concerns or issues? no     Clinical Interventions: Reviewed and followed up on alerts and treatments-Patient has low SpO2 Level of 90%. Denies CP, SOB, Swelling hands and feet. Patient used Spiriva Inhaler this morning. Patient states, \"I use it everyday\". . Patient tried to recheck SpO2 level. IT called and will trouble shoot PO. Rechecked SpO2 level for 92%. No further action needed at this time. Education of patient/family/caregiver/guardian to support self-management-Recheck SpO2 level  Have warm hands, hold hands and fingers very still while testing SpO2 level. Take a few deep breaths before testing. Advised Patient to contact PCP  regarding any health concerns for early outpatient intervention in an effort to avoid hospitalization. Report any worsening symptoms to PCP and/or Call 911 and/or GO TO  EMERGENCY ROOM if symptoms are severe or worsening. Expresses understanding. Plan/Follow Up:  Will continue to review, monitor and address alerts with follow up based on severity

## 2023-08-02 ENCOUNTER — CARE COORDINATION (OUTPATIENT)
Dept: CASE MANAGEMENT | Age: 58
End: 2023-08-02

## 2023-08-02 NOTE — CARE COORDINATION
Remote Alert Monitoring Note  Rpm alert to be reviewed by the provider   red alert   weight (Gain 3# over night and 5.5# in 7 Days)   Additional needs to be addressed by N/A: No                    Date/Time:  2023 8:36 AM  Patient Current Location: [place]  LPN contacted patient by telephone. Verified patients name and  as identifiers. Background: HTN, COPD, CHF  Refer to 911 immediately if:  Patient unresponsive or unable to provide history  Change in cognition or sudden confusion  Patient unable to respond in complete sentences  Intense chest pain/tightness  Any concern for any clinical emergency  Red Alert: Provider response time of 1 hr required for any red alert requiring intervention  Yellow Alert: Provider response time of 3hr required for any escalated yellow alert    Weight Scale Triage  Was your weight obtained upon rising/waking today? YES   Was your weight obtained after voiding and/or use of the bathroom today? yes   Did you weigh yourself in the same amount of clothing today, compared to how you typically do? yes   Was the scale bumped or moved prior to today's weight? no   Is your scale on a flat/hard surface? yes   Did you obtain your weight with shoes on? no   If yes, is this something you normally do during your daily weights? no   Were you standing up straight on the scale today? yes   Were you leaning on anything while obtaining your weight today? no     Clinical Interventions: Reviewed and followed up on alerts and treatments-Patient has 3# weight gain over night and 5.5# weight gain in 7 days. Patient denies CP, SOB, Swelling, Increased NA intake, Scale issues. Next IM appointment 23. Patient has taken HCTZ 25 mg with KLC 20 MEq already today. Patient states, \"I am feeling real good\". Patient's PCP is Dr. Farnklyn Beatty [facility] doctor and Dr. Claire Maria takes care of patient's CHF - per patient). Since patient is symptomatic at this time will NOT escalate.     Education of

## 2023-08-03 ENCOUNTER — CARE COORDINATION (OUTPATIENT)
Dept: CASE MANAGEMENT | Age: 58
End: 2023-08-03

## 2023-08-03 NOTE — CARE COORDINATION
Remote Alert Monitoring Note  Rpm alert to be reviewed by the provider   red alert   pulse ox reading (88%) and weight (3# Over Night 7# in 7 days 259 - 266.5)   Additional needs to be addressed by N/A: No                    Date/Time:  8/3/2023 8:08 AM  Patient Current Location: [place]  LPN contacted patient by telephone. Verified patients name and  as identifiers. Attempted to reach patient X 3 for RPM Red Alert Call. Unable to reach patient. No Answer  Attempted to reach ER Contact at 2 different numbers - Voice Mail not set up. ACM Notified   Will continue to follow. Nisha Tabor LPN    [phone]  [facility] Coordinator      Background: HTN, COPD, CHF  Refer to 911 immediately if:  Patient unresponsive or unable to provide history  Change in cognition or sudden confusion  Patient unable to respond in complete sentences  Intense chest pain/tightness  Any concern for any clinical emergency  Red Alert: Provider response time of 1 hr required for any red alert requiring intervention  Yellow Alert: Provider response time of 3hr required for any escalated yellow alert    Plan/Follow Up: Will continue to review, monitor and address alerts with follow up based on severity of symptoms and risk factors.     Current Patient Metrics ---- Blood Pressure: 111/74, 81bpm Pulseox: 88%, 81bpm Survey: - Weight: 266.5lbs Note Created at: 2023 02:22 PM ET ---- Time-Spent: 8 minutes 0 seconds

## 2023-08-04 ENCOUNTER — CARE COORDINATION (OUTPATIENT)
Dept: CASE MANAGEMENT | Age: 58
End: 2023-08-04

## 2023-08-04 VITALS
HEART RATE: 78 BPM | WEIGHT: 264.5 LBS | DIASTOLIC BLOOD PRESSURE: 73 MMHG | SYSTOLIC BLOOD PRESSURE: 110 MMHG | OXYGEN SATURATION: 90 % | BODY MASS INDEX: 33.96 KG/M2

## 2023-08-04 NOTE — TELEPHONE ENCOUNTER
Dr. Hal Spear, patient has alerted 2 days for weight gain. Please read info in yellow box of note. Thank you.

## 2023-08-04 NOTE — CARE COORDINATION
Remote Alert Monitoring Note  Rpm alert to be reviewed by the provider   red alert   pulse ox reading (90) and weight (264.5)   Additional needs to be addressed by PCP:  Pt has not been reached for yesterday or today's alerts for pulse and weight. His weight is down 2#s from yesterday's reading. Date/Time:  8/4/2023 9:28 AM    Background: RPM Red Alert   Refer to 911 immediately if:  Patient unresponsive or unable to provide history  Change in cognition or sudden confusion  Patient unable to respond in complete sentences  Intense chest pain/tightness  Any concern for any clinical emergency  Red Alert: Provider response time of 1 hr required for any red alert requiring intervention  Yellow Alert: Provider response time of 3hr required for any escalated yellow alert      Clinical Interventions: Escalated alert to PCP-unable to reach pt 2 days in a row  Alerted ACM    Plan/Follow Up: Will continue to review, monitor and address alerts with follow up based on severity of symptoms and risk factors.     Current Patient Metrics ---- Blood Pressure: 110/73, 78bpm Pulseox: 90%, 82bpm Survey: - Weight: 264.5lbs Note Created at: 08/04/2023 09:35 AM ET ---- Time-Spent: 4 minutes 0 seconds    Future Appointments   Date Time Provider [address]   11/2/2023  8:15 AM Roberth De Jesus MD [facility] JODY Yadav

## 2023-08-07 ENCOUNTER — CARE COORDINATION (OUTPATIENT)
Dept: CARE COORDINATION | Facility: CLINIC | Age: 58
End: 2023-08-07

## 2023-08-07 NOTE — CARE COORDINATION
Remote Alert Monitoring Note  Rpm alert to be reviewed by the provider   red alert   weight (277.0 lbs)   Additional needs to be addressed by N/A: No                    Date/Time:  2023 1:51 PM  Patient Current Location: Home: [address] [place] [address] contacted patient by telephone. Verified patients name and  as identifiers. Background: Pt enrolled in RPM r/t HTN, COPD, and CHF  Refer to 911 immediately if:  Patient unresponsive or unable to provide history  Change in cognition or sudden confusion  Patient unable to respond in complete sentences  Intense chest pain/tightness  Any concern for any clinical emergency  Red Alert: Provider response time of 1 hr required for any red alert requiring intervention  Yellow Alert: Provider response time of 3hr required for any escalated yellow alert    Weight Scale Triage  Was your weight obtained upon rising/waking today? no   Was your weight obtained after voiding and/or use of the bathroom today? yes   Did you weigh yourself in the same amount of clothing today, compared to how you typically do? yes   Was the scale bumped or moved prior to today's weight? no   Is your scale on a flat/hard surface? yes   Did you obtain your weight with shoes on? no   If yes, is this something you normally do during your daily weights? NA   Were you standing up straight on the scale today? yes   Were you leaning on anything while obtaining your weight today? no     Weight Triage  Are you weighing any different than you did yesterday? (time of day, clothes and shoes on or off, etc)? Yes, time   Do you have any shortness of breath? no   Do you have any swelling in your hands of feet? no   Are you having any other health concerns or issues? no     Clinical Interventions: Reviewed and followed up on alerts and treatments-discussed alert for wt gain with pt. Pt denies SOB, new or worsening edema, and increased  NA intake at this time.  RPM weights reviewed; error

## 2023-08-08 ENCOUNTER — CARE COORDINATION (OUTPATIENT)
Dept: CARE COORDINATION | Facility: CLINIC | Age: 58
End: 2023-08-08

## 2023-08-08 NOTE — CARE COORDINATION
Remote Patient Monitoring Note      Date/Time:  8/8/2023 2:07 PM  Patient Current Location: [facility] red alert received for weight increase (of 5 lbs in last 7 days). Patients weight is down 3.5 lbs in one day. LPN previously spoke with pt, on 8/7/23, regarding weight gain. Pt denied SOB, new or worsening edema, and increased NA intake. Pt v/u of when to notify provider of changes / concerns and when to seek emergent medical care. Escalated to RN on 8/7/23. No outreach by this LPN indicated at this time. Will route to [facility] for review. Background: Pt enrolled in RPM r/t HTN, COPD, and CHF  Clinical Interventions: Escalated alert to RN-update provided      Plan/Follow Up: Will continue to review, monitor and address alerts with follow up based on severity of symptoms and risk factors.

## 2023-08-09 ENCOUNTER — CARE COORDINATION (OUTPATIENT)
Dept: CASE MANAGEMENT | Age: 58
End: 2023-08-09

## 2023-08-09 NOTE — CARE COORDINATION
Remote Alert Monitoring Note  Rpm alert to be reviewed by the provider   red alert   pulse ox reading (91)   Additional needs to be addressed by N/A:  Pt repeated metrics and O2 WNL                    Date/Time:  2023 8:52 AM  Patient Current Location: Home: [address] [place] [address]  [address] [address]  CTN contacted patient by telephone. Verified patients name and  as identifiers. Background: RPM for HTN, COPD and CHF  Refer to 911 immediately if:  Patient unresponsive or unable to provide history  Change in cognition or sudden confusion  Patient unable to respond in complete sentences  Intense chest pain/tightness  Any concern for any clinical emergency  Red Alert: Provider response time of 1 hr required for any red alert requiring intervention  Yellow Alert: Provider response time of 3hr required for any escalated yellow alert    O2 Triage  Are you having any Chest Pain? no   Are you having any Shortness of Breath? no   Swelling in your hands or feet? no     Are you having any other health concerns or issues? no      Clinical Interventions:  PT states he is doing very well, no issues with edema or SOB. Had pt re do the metrics O2 was 94%    Plan/Follow Up: Will continue to review, monitor and address alerts with follow up based on severity of symptoms and risk factors.     Current Patient Metrics ---- Blood Pressure: 111/55, 90bpm Pulseox: 94%, 78bpm Survey: - Weight: 267.5lbs Note Created at: 2023 08:54 AM ET ---- Time-Spent: 5 minutes 0 seconds

## 2023-08-11 ENCOUNTER — CARE COORDINATION (OUTPATIENT)
Dept: CARE COORDINATION | Facility: CLINIC | Age: 58
End: 2023-08-11

## 2023-08-11 NOTE — CARE COORDINATION
Remote Alert Monitoring Note  Rpm alert to be reviewed by the provider   red alert   pulse ox reading (89%)   Additional needs to be addressed by N/A: No                    Date/Time:  2023 9:20 AM  Patient Current Location: Home: [address] [facility]  [address] [address]  LPN contacted patient by telephone. Verified patients name and  as identifiers. Background: Pt enrolled in RPM r/t HTN, COPD, and CHF  Refer to 911 immediately if:  Patient unresponsive or unable to provide history  Change in cognition or sudden confusion  Patient unable to respond in complete sentences  Intense chest pain/tightness  Any concern for any clinical emergency  Red Alert: Provider response time of 1 hr required for any red alert requiring intervention  Yellow Alert: Provider response time of 3hr required for any escalated yellow alert    O2 Triage  Are you having any Chest Pain? no   Are you having any Shortness of Breath? no   Swelling in your hands or feet? no     Are you having any other health concerns or issues? no      Clinical Interventions: Reviewed and followed up on alerts and treatments-Pt speaking in full sentences, denies CP, SOB, and new or worsening edema. Pt agreeable to recheck pulse ox at this time with updated reading of 92% reported. Pt v/u of when to notify provider of changes / concerns and when to seek emergent medical care. Plan/Follow Up: Will continue to review, monitor and address alerts with follow up based on severity of symptoms and risk factors.

## 2023-08-16 ENCOUNTER — CARE COORDINATION (OUTPATIENT)
Dept: CARE COORDINATION | Age: 58
End: 2023-08-16

## 2023-08-16 ENCOUNTER — CARE COORDINATION (OUTPATIENT)
Dept: CASE MANAGEMENT | Age: 58
End: 2023-08-16

## 2023-08-16 NOTE — CARE COORDINATION
Remote Alert Monitoring Note  Rpm alert to be reviewed by the provider   red alert   blood pressure reading (84/56)   Additional needs to be addressed by N/A: No                    Date/Time:  8/16/2023 8:40 AM  Patient Current Location: [place]  LPN attempted to contacted patient by telephone. Attempted to reach patient X 2 and ER Contact X 2 for RPM Red Alert Call. Unable to reach patient. Mail Box not set up, No answer, Mail Box full, Can't be reached as dialed. ACM Notified  Will continue to follow. Porsha Ott LPN    [phone]  [facility] Coordinator      Background: HTN, COPD, CHF  Refer to 911 immediately if:  Patient unresponsive or unable to provide history  Change in cognition or sudden confusion  Patient unable to respond in complete sentences  Intense chest pain/tightness  Any concern for any clinical emergency  Red Alert: Provider response time of 1 hr required for any red alert requiring intervention  Yellow Alert: Provider response time of 3hr required for any escalated yellow alert  Plan/Follow Up: Will continue to review, monitor and address alerts with follow up based on severity of symptoms and risk factors.     Current Patient Metrics ---- Blood Pressure: 84/56, 85bpm Pulseox: 92%, 90bpm Survey: - Weight: 255.5lbs Note Created at: 08/16/2023 03:36 PM ET ---- Time-Spent: 10 minutes 0 seconds

## 2023-08-17 ENCOUNTER — CARE COORDINATION (OUTPATIENT)
Dept: CASE MANAGEMENT | Age: 58
End: 2023-08-17

## 2023-08-17 NOTE — CARE COORDINATION
Remote Alert Monitoring Note  Rpm alert to be reviewed by the provider   red alert   blood pressure reading (77/59) and pulse ox reading (88%)   Additional needs to be addressed by  IM :  Aixa Joel MD  Patient has low SpO2 level of 88%. Denies CP, SOB, Dizziness, Fatigue, Swelling hands and feet. Patient has taken his morning medications including Coreg 25 mg twice daily, HCTZ 25 mg daily, Losartan 100 mg daily. Patient recheck SpO2 level at 88% & 87%. Patient is asymptomatic at this time and uses no Oxygen at home. Please See VS in Chart Note. Date/Time:  2023 8:14 AM  Patient Current Location: [facility] contacted patient by telephone. Verified patients name and  as identifiers. Background: HTN, COPD, CHF  Refer to 911 immediately if:  Patient unresponsive or unable to provide history  Change in cognition or sudden confusion  Patient unable to respond in complete sentences  Intense chest pain/tightness  Any concern for any clinical emergency  Red Alert: Provider response time of 1 hr required for any red alert requiring intervention  Yellow Alert: Provider response time of 3hr required for any escalated yellow alert    BP Triage  Are you having any Chest Pain? no   Are you having any Shortness of Breath? no   Do you have a headache or have any vision changes? no   Are you having any numbness or tingling? no   Are you having any other health concerns or issues? no        O2 Triage  Are you having any Chest Pain? no   Are you having any Shortness of Breath? no   Swelling in your hands or feet? no     Are you having any other health concerns or issues? no      Clinical Interventions: Reviewed and followed up on alerts and treatments-Patient has low BP of 77/59 and low SpO2 level of 88%. Denies CP, SOB, Dizziness, Fatigue, Swelling hands and feet. Patient has taken his morning medications including Coreg 25 mg twice daily, HCTZ 25 mg daily, Losartan 100 mg daily.   Patient

## 2023-08-18 ENCOUNTER — CARE COORDINATION (OUTPATIENT)
Dept: CASE MANAGEMENT | Age: 58
End: 2023-08-18

## 2023-08-18 ENCOUNTER — CARE COORDINATION (OUTPATIENT)
Dept: CARE COORDINATION | Age: 58
End: 2023-08-18

## 2023-08-18 NOTE — CARE COORDINATION
Remote Alert Monitoring Note  Rpm alert to be reviewed by the provider   red alert   blood pressure reading (81/56) and pulse ox reading (86)   Additional needs to be addressed by PCP:  Dr. Hal Spear For low BP  Patient has low BP of 87%. Low BP of 81/56. Denies CP, SOB, Uses NO oxygen at home,  Dizziness, Fatigue, Swelling hands and feet. Patient states, \"I feel fine\". Patient has taken morning medications including Losartan 100 mg daily, Coreg 25 mg twice daily, HCTZ 25 mg daily. Rechecked BP at 89/62  and  SpO2 Level at 92%. Rechecked BP 89/62. Then rechecked in an hour and was 75/52. Will Escalate to IM Dr. Hal Spear for low BP. Please see VS in Chart Note. Date/Time:  2023 8:16 AM  [facility] Verified patients name and  as identifiers. Background: HTN, COPD, CHF  Refer to 911 immediately if:  Patient unresponsive or unable to provide history  Change in cognition or sudden confusion  Patient unable to respond in complete sentences  Intense chest pain/tightness  Any concern for any clinical emergency  Red Alert: Provider response time of 1 hr required for any red alert requiring intervention  Yellow Alert: Provider response time of 3hr required for any escalated yellow alert    BP Triage  Are you having any Chest Pain? no   Are you having any Shortness of Breath? no   Do you have a headache or have any vision changes? no   Are you having any numbness or tingling? no   Are you having any other health concerns or issues? no        O2 Triage  Are you having any Chest Pain? no   Are you having any Shortness of Breath? no   Swelling in your hands or feet? no     Are you having any other health concerns or issues? no      Clinical Interventions: Reviewed and followed up on alerts and treatments-Patient has low BP of 87%. Low BP of 81/56. Denies CP, SOB, Uses NO oxygen at home,  Dizziness, Fatigue, Swelling hands and feet.

## 2023-08-21 ENCOUNTER — CARE COORDINATION (OUTPATIENT)
Dept: CASE MANAGEMENT | Age: 58
End: 2023-08-21

## 2023-08-21 ENCOUNTER — CLINICAL DOCUMENTATION (OUTPATIENT)
Dept: CASE MANAGEMENT | Age: 58
End: 2023-08-21

## 2023-08-21 NOTE — CARE COORDINATION
Remote Alert Monitoring Note  Rpm alert to be reviewed by the provider   red alert   pulse ox reading (87%) Weight gain of 5# in 7 days 255.5 to 261. Additional needs to be addressed by N/A: No                    Date/Time:  2023 11:10 AM  [facility] Verified patients name and  as identifiers. Background: HTN, COPD, CHF  Refer to 911 immediately if:  Patient unresponsive or unable to provide history  Change in cognition or sudden confusion  Patient unable to respond in complete sentences  Intense chest pain/tightness  Any concern for any clinical emergency  Red Alert: Provider response time of 1 hr required for any red alert requiring intervention  Yellow Alert: Provider response time of 3hr required for any escalated yellow alert    O2 Triage  Are you having any Chest Pain? no   Are you having any Shortness of Breath? no   Swelling in your hands or feet? no     Are you having any other health concerns or issues? no      Clinical Interventions: Reviewed and followed up on alerts and treatments-Patient has low SpO2 level of 87% and weight gain of 5# in 7 days. Denies CP, SOB, Swelling hands and feet, Increased NA intake, Scale issues. Patient rechecked SpO2 level at 89%. Will recheck SpO2 later on. Has weight gain of 5# in 7 days. Denies CP, SOB, Swelling, Increased NA intake and Scale issues. Rechecked SpO2 level at 91%. No escalated as patient has no S/S. Education of patient/family/caregiver/guardian to support self-management-Recheck SpO2 level  Have warm hands, hold hands and fingers very still while testing SpO2 level. Take a few deep breaths before testing. Instructed patient on the importance of weighing daily, in the morning after urinating, Same Clothing on, NO shoes, Scale on Flat/hard surface, and that if the patient has weight gain of 3# over night or 5# weight gain in a week to call their physician immediately and report.

## 2023-08-21 NOTE — CARE COORDINATION
Opened in ERROR.     Michelle Pedraza LPN    [phone]  [facility] / [address] [place] [address] / Kanu Ferguson

## 2023-08-23 ENCOUNTER — CARE COORDINATION (OUTPATIENT)
Dept: CASE MANAGEMENT | Age: 58
End: 2023-08-23

## 2023-08-23 NOTE — CARE COORDINATION
Remote Alert Monitoring Note  Rpm alert to be reviewed by the provider   red alert   pulse ox reading (91%)   Additional needs to be addressed by N/A: No                    Date/Time:  8/23/2023 8:22 AM  [facility]. Attempted to reach patient X 3 for RPM Red Alert Call. Unable to reach patient. - No answer  Attempted to reach ER contact at 2 numbers: Voice mail not set up and Call can't be completed as dialed. ACM notified  Will continue to follow. Daylin Lynch LPN    [phone]  [facility] Coordinator      Background: HTN, COPD, CHF  Refer to 911 immediately if:  Patient unresponsive or unable to provide history  Change in cognition or sudden confusion  Patient unable to respond in complete sentences  Intense chest pain/tightness  Any concern for any clinical emergency  Red Alert: Provider response time of 1 hr required for any red alert requiring intervention  Yellow Alert: Provider response time of 3hr required for any escalated yellow alert  Plan/Follow Up: Will continue to review, monitor and address alerts with follow up based on severity of symptoms and risk factors.     Current Patient Metrics ---- Blood Pressure: 107/77, 87bpm Pulseox: 91%, 81bpm Survey: - Weight: 257.5lbs Note Created at: 08/23/2023 02:41 PM ET ---- Time-Spent: 10 minutes 0 seconds

## 2023-08-24 ENCOUNTER — CARE COORDINATION (OUTPATIENT)
Dept: CASE MANAGEMENT | Age: 58
End: 2023-08-24

## 2023-08-24 NOTE — CARE COORDINATION
Pressure: 97/69, 84bpm Pulseox: 92%, 64bpm Survey: - Weight: 258.5lbs Note Created at: 08/24/2023 09:06 AM ET ---- Time-Spent: 10 minutes 0 seconds

## 2023-08-25 ENCOUNTER — CARE COORDINATION (OUTPATIENT)
Dept: CARE COORDINATION | Age: 58
End: 2023-08-25

## 2023-08-25 ENCOUNTER — CARE COORDINATION (OUTPATIENT)
Dept: CASE MANAGEMENT | Age: 58
End: 2023-08-25

## 2023-08-25 ASSESSMENT — ENCOUNTER SYMPTOMS: DYSPNEA ASSOCIATED WITH: EXERTION

## 2023-08-25 NOTE — CARE COORDINATION
Remote Alert Monitoring Note  Rpm alert to be reviewed by the provider   red alert   pulse ox reading (87%)   Additional needs to be addressed by N/A: No                    Date/Time:  8/25/2023 8:47 AM  Patient Current Location: [place]  LPN attempted to contacted patient by telephone. Attempted to reach patient X 2 for RPM Red Alert Call. Unable to reach patient. - No Answer    Attempted to reach ER Contact for RPM Red Alert. Unable to reach - Voice mail not set up. ACM Notified      Will continue to follow. Deangelo Solomon LPN    [phone]  [facility] Coordinator    Patient recheck SpO2 level at 91%. Background: HTN, COPD, CHF  Refer to 911 immediately if:  Patient unresponsive or unable to provide history  Change in cognition or sudden confusion  Patient unable to respond in complete sentences  Intense chest pain/tightness  Any concern for any clinical emergency  Red Alert: Provider response time of 1 hr required for any red alert requiring intervention  Yellow Alert: Provider response time of 3hr required for any escalated yellow alert  Plan/Follow Up: Will continue to review, monitor and address alerts with follow up based on severity of symptoms and risk factors.     Current Patient Metrics ---- Blood Pressure: 103/71, 82bpm Pulseox: 91%, 75bpm Survey: - Weight: 258.5lbs Note Created at: 08/25/2023 01:28 PM ET ---- Time-Spent: 6 minutes 0 seconds

## 2023-08-25 NOTE — CARE COORDINATION
Ambulatory Care Coordination Note  2023    Patient Current Location:  Home: [address]  [address]  [address] [facility] [id number]     ACM contacted the patient by telephone. Verified name and  with patient as identifiers. Provided introduction to self, and explanation of the ACM role. Challenges to be reviewed by the provider   Additional needs identified to be addressed with provider: Yes  Pt needs digital scales for daily wt monitoring. Request sent to provider. Order will need sent to [employer] as this ACM is completing [employer] voucher for scales. Method of communication with provider: none. ACM: Dianna Rod, RN    Felipe Flowers was referred to care coordination by education and assistance in managing his chronic conditions and healthcare needs. Spoke with Felipe Flowers today. Pt was referred for RPM by Dr. Sabino Hartley. Pt has h/o: CHF, COPD, HLD, HTN, schizophrenia. Pt on RPM. Has been alerting for low SPO2 reading although typically upon recheck SPO2 readings have been above 90%. Will check with RPM staff to see if able to issue pt a new pulse oximeter. Pt rechecked SPO2 while on phone today and repeat was 91%. Pt reports that he feels good. No SOB, cough or congestion. CHF: wts have remained stable. No significant fluctuations over the past few days. Reinforced lmportance of low sodium diet. Spoke with Patricia Marsh. She will place equipment replacement request.   Continues to f/u with psych  Pharmacy filling pill box every 2 wks. Plan of care:  RPM equipment replacement request placed. Pt does not have his own scales. Will send [employer] action request to [employer] and to PCP office. Continue monitoring VS's daily  Continue f/u with psych  Reinforce COPD and CHF zones with each call.    Congestive Heart Failure Assessment    Do you understand a low sodium diet?: Yes  Do you understand how to read food labels?: Yes         Symptoms:  CHF associated

## 2023-08-28 ENCOUNTER — CARE COORDINATION (OUTPATIENT)
Dept: CASE MANAGEMENT | Age: 58
End: 2023-08-28

## 2023-08-28 NOTE — CARE COORDINATION
Remote Alert Monitoring Note  Rpm alert to be reviewed by the provider   red alert   pulse ox reading (89%) and weight (7# in 7 Days)   Additional needs to be addressed by  Internal Medicine :  Nila Watts MD  Patient has low SpO2 level of 89%  Denies CP, SOB, Swelling, Increased NA intake, Scale Issues. Patient states, \"I have no problems\". Used Inhaler earlier today. Patient rechecked SpO2 level and was 85%. Patient will recheck SpO2 Level later on. Rechecked SpO2 level for 89%. Will Escalate to IM for low SpO2 level. No response from IM. Will sent to Flordia Schwab, CNP. See current VS in Chart Note. Date/Time:  2023 8:38 AM  Patient Current Location: [facility] contacted patient by telephone. Verified patients name and  as identifiers. Background: HTN, COPD, CHF  Refer to 911 immediately if:  Patient unresponsive or unable to provide history  Change in cognition or sudden confusion  Patient unable to respond in complete sentences  Intense chest pain/tightness  Any concern for any clinical emergency  Red Alert: Provider response time of 1 hr required for any red alert requiring intervention  Yellow Alert: Provider response time of 3hr required for any escalated yellow alert    O2 Triage  Are you having any Chest Pain? no   Are you having any Shortness of Breath? no   Swelling in your hands or feet? no     Are you having any other health concerns or issues? no      Weight Scale Triage  Was your weight obtained upon rising/waking today? Yes   Was your weight obtained after voiding and/or use of the bathroom today? yes   Did you weigh yourself in the same amount of clothing today, compared to how you typically do? yes   Was the scale bumped or moved prior to today's weight? no   Is your scale on a flat/hard surface?  yes   Did you obtain your weight with shoes on? no   If yes, is this something you normally do during your daily weights? no   Were you standing up straight on the scale

## 2023-08-29 ENCOUNTER — CARE COORDINATION (OUTPATIENT)
Dept: CASE MANAGEMENT | Age: 58
End: 2023-08-29

## 2023-08-29 NOTE — CARE COORDINATION
Remote Alert Monitoring Note  Rpm alert to be reviewed by the provider   red alert   weight (Gain 8# in 7 days)   Additional needs to be addressed by N/A: No                    Date/Time:  2023 8:12 AM  Patient Current Location: [place]  LPN contacted patient by telephone. Verified patients name and  as identifiers. Attempted to reach patient X 2 for RPM Red Alert Call. Unable to reach patient. NO ANSWER  Attempted to contact ER Contact at 2 different numbers - 1 Voice mail not set up  2 Call can't be completed as dialed. Left HIPAA Compliant message on Voice Mail to call. Phone number left on Voice Mail to call back. Will continue to follow. Michelle Pedraza LPN    [phone]  [facility] Coordinator      Background: HTN, COPD, CHF  Refer to 911 immediately if:  Patient unresponsive or unable to provide history  Change in cognition or sudden confusion  Patient unable to respond in complete sentences  Intense chest pain/tightness  Any concern for any clinical emergency  Red Alert: Provider response time of 1 hr required for any red alert requiring intervention  Yellow Alert: Provider response time of 3hr required for any escalated yellow alert  Plan/Follow Up: Will continue to review, monitor and address alerts with follow up based on severity of symptoms and risk factors.      Current Patient Metrics ---- Blood Pressure: 108/72, 80bpm Pulseox: 92%, 79bpm Survey: - Weight: 266.0lbs Note Created at: 2023 02:53 PM ET ---- Time-Spent: 10 minutes 0 seconds

## 2023-08-31 ENCOUNTER — CARE COORDINATION (OUTPATIENT)
Dept: CASE MANAGEMENT | Age: 58
End: 2023-08-31

## 2023-08-31 NOTE — CARE COORDINATION
Remote Patient Monitoring Note      Date/Time:  2023 2:23 PM  Patient Current Location: [place]  LPN contacted patient by telephone regarding yellow alert received for NO VS for 2 days. Verified patients name and  as identifiers. Background: HTN, COPD, CHF  Clinical Interventions: Reviewed and followed up on alerts and treatments-     Patient says his sister will be back in town tomorrow and they will start putting VS in.     Plan/Follow Up: Will continue to review, monitor and address alerts with follow up based on severity of symptoms and risk factors.       Current Patient Metrics ---- Blood Pressure: -/-, -bpm Pulseox: -%, -bpm Survey: - Weight: -lbs Note Created at: 2023 02:24 PM ET ---- Time-Spent: 2 minutes 0 seconds

## 2023-09-01 ENCOUNTER — CARE COORDINATION (OUTPATIENT)
Dept: CASE MANAGEMENT | Age: 58
End: 2023-09-01

## 2023-09-01 DIAGNOSIS — R79.89 HIGH SERUM LOW DENSITY LIPOPROTEIN (LDL) CHOLESTEROL: ICD-10-CM

## 2023-09-01 NOTE — CARE COORDINATION
Remote Patient Monitoring Note      Date/Time:  9/1/2023 2:52 PM  Patient Current Location: [address] [place] [address]  LPN Attempted to contacted patient by telephone regarding yellow alert received for No VS 3 days. Background: HTN, COPD, CHF  Clinical Interventions: Reviewed and followed up on alerts and treatments-     Attempted to reach patient for RPM yellow Alert. Unable to reach patient. No Answer  Attempted to call ER Contact. Voice Mail not set up. ACM Notified    Will continue to follow. Abbe Diez LPN    [phone]  [facility] Coordinator       Plan/Follow Up: Will continue to review, monitor and address alerts with follow up based on severity of symptoms and risk factors.      Current Patient Metrics ---- Blood Pressure: -/-, -bpm Pulseox: -%, -bpm Survey: - Weight: -lbs Note Created at: 09/01/2023 02:55 PM ET ---- Time-Spent: 4 minutes 0 seconds

## 2023-09-05 ENCOUNTER — CARE COORDINATION (OUTPATIENT)
Dept: CASE MANAGEMENT | Age: 58
End: 2023-09-05

## 2023-09-05 NOTE — CARE COORDINATION
Remote Patient Monitoring Note      Date/Time:  2023 3:07 PM  Patient Current Location: [place]  LPN contacted patient by telephone regarding yellow alert received for No VS for 7 days. Verified patients name and  as identifiers. Background: HTN, COPD, CHF  Clinical Interventions: Reviewed and followed up on alerts and treatments-     Spoke to patient. He is waiting for his sister to come and help him hook up the RPM equipment. He will call IT if she doesn't come over tonight. ACM Notified. Plan/Follow Up: Will continue to review, monitor and address alerts with follow up based on severity of symptoms and risk factors.      Current Patient Metrics ---- Blood Pressure: -/-, -bpm Pulseox: -%, -bpm Survey: - Weight: -lbs Note Created at: 2023 03:09 PM ET ---- Time-Spent: 3 minutes 0 seconds

## 2023-09-06 ENCOUNTER — CARE COORDINATION (OUTPATIENT)
Dept: CASE MANAGEMENT | Age: 58
End: 2023-09-06

## 2023-09-06 RX ORDER — ATORVASTATIN CALCIUM 20 MG/1
20 TABLET, FILM COATED ORAL DAILY
Qty: 30 TABLET | Refills: 5 | Status: SHIPPED | OUTPATIENT
Start: 2023-09-06

## 2023-09-06 NOTE — CARE COORDINATION
Remote Alert Monitoring Note  Rpm alert to be reviewed by the provider   red alert   pulse ox reading (87%)   Additional needs to be addressed by N/A: No                    Date/Time:  2023 10:21 AM  Patient Current Location: [facility] contacted patient by telephone. Verified patients name and  as identifiers. Background: HTN, CHF, COPD  Refer to 911 immediately if:  Patient unresponsive or unable to provide history  Change in cognition or sudden confusion  Patient unable to respond in complete sentences  Intense chest pain/tightness  Any concern for any clinical emergency  Red Alert: Provider response time of 1 hr required for any red alert requiring intervention  Yellow Alert: Provider response time of 3hr required for any escalated yellow alert    O2 Triage  Are you having any Chest Pain? no   Are you having any Shortness of Breath? no   Swelling in your hands or feet? no     Are you having any other health concerns or issues? no      Clinical Interventions: Reviewed and followed up on alerts and treatments-Patient has low SpO2 level of 87%. Denies CP, SOB, Swelling Hands and feet, Dizziness. Patient used Spiriva inhaler this morning daily. Uses Albuterol PRN. No oxygen used at home. Recheck SpO2 level at 93%. No further action needed. Education of patient/family/caregiver/guardian to support self-management-Recheck SpO2 level  Have warm hands, hold hands and fingers very still while testing SpO2 level. Take a few deep breaths before testing. Advised Patient to contact PCP  regarding any health concerns for early outpatient intervention in an effort to avoid hospitalization. Report any worsening symptoms to PCP and/or Call 911 and/or GO TO  EMERGENCY ROOM if symptoms are severe or worsening. Expresses understanding. Plan/Follow Up: Will continue to review, monitor and address alerts with follow up based on severity of symptoms and risk factors.     Current Patient Metrics ---- Blood

## 2023-09-11 ENCOUNTER — CARE COORDINATION (OUTPATIENT)
Dept: CARE COORDINATION | Age: 58
End: 2023-09-11

## 2023-09-11 DIAGNOSIS — I50.9 CONGESTIVE HEART FAILURE, UNSPECIFIED HF CHRONICITY, UNSPECIFIED HEART FAILURE TYPE (HCC): ICD-10-CM

## 2023-09-11 DIAGNOSIS — I10 ESSENTIAL HYPERTENSION: Primary | ICD-10-CM

## 2023-09-11 DIAGNOSIS — J44.1 COPD EXACERBATION (HCC): ICD-10-CM

## 2023-09-11 ASSESSMENT — ENCOUNTER SYMPTOMS: DYSPNEA ASSOCIATED WITH: EXERTION

## 2023-09-11 NOTE — CARE COORDINATION
Ambulatory Care Coordination Note  2023    Patient Current Location:  Home: [address] [place] [address]  [address] [facility] [id number]     ACM contacted the patient by telephone. Verified name and  with patient as identifiers. Provided introduction to self, and explanation of the ACM role. Challenges to be reviewed by the provider   Additional needs identified to be addressed with provider: No  none               Method of communication with provider: none. ACM: LINDA Avilaomer Perez was referred to care coordination by education and assistance in managing his chronic conditions and healthcare needs. Spoke with Suzanna Perez today. Pt was referred for RPM by Dr. Luis Antonio Jarvis. Pt has h/o: CHF, COPD, HLD, HTN, schizophrenia. Spoke with Suzanna Perez today for f/u. Pt reports that overall he is doing well. Since pt received new RPM equipment VS's have been more stable. VS's today: 138/77-78-94% RA-wt 272#. Pt is an active smoker. Not ready to quit. Denies changes with breathing. Denies cough/congestion  CHF: wts fluctuating slightly. Denies edema  Pt would like to lose wt. He is agreeable with central dietician referral.      Offered patient enrollment in the Remote Patient Monitoring (RPM) program for in-home monitoring: Yes, patient already enrolled  Remote Patient Monitoring Graduation      Date/Time:  2023 1:33 PM  Patient Current Location: Home: [address] [place] [address]  [address] [facility] [address]  Patient has graduated from the Remote Patient Monitoring program on 2023. RPM goals have been met at this time. Patient has been provided instruction on process to return RPM equipment and RPM has been deactivated. Patient has ACM's contact information for any further questions, concerns, or needs.   .Created at: 2023 03:53 PM ET ---- Time-Spent: 5 minutes 0 second  Plan of care:  Graduating from [address] [place] [address] COPD/CHF zones  Central dietician referral   Congestive Heart Failure Assessment    Do

## 2023-09-11 NOTE — PROGRESS NOTES
Remote Patient Order Discontinued    Received request from Isa Rader RN  to discontinue order for remote patient monitoring of CHF, COPD, and HTN and order completed.

## 2023-09-12 ENCOUNTER — CARE COORDINATION (OUTPATIENT)
Dept: CARE COORDINATION | Age: 58
End: 2023-09-12

## 2023-09-12 NOTE — CARE COORDINATION
RD received referral from Rae RUBALCAVA:  New referral.  Pt has h/o COPD, CHF. Wanting to lose wt. Could benefit from low sodium and wt loss diet education. Please reach out with any questions. RD contacted Jyotsna Loving regarding Dietitian referral. Pt answered, RD explained reason for call and role in care. Pt requested RD call back on a different day- no preference per patient. RD will contact pt within one week and follow up as appropriate.        [address] [facility],   [phone]

## 2023-09-15 ENCOUNTER — CARE COORDINATION (OUTPATIENT)
Dept: CARE COORDINATION | Age: 58
End: 2023-09-15

## 2023-09-15 NOTE — CARE COORDINATION
Contacted [facility] regarding Dietitian referral. Pt answered, RD explained reason for call and role in care. Patient states he is not interested in nutrition assessment/education at this time. RD offered to mail educational handouts to patient, patient accepted. RD verified address. RD will mail Meal Planner CHF, Heart Failure Nutrition Therapy and Weight Loss Tips. RD will outreach in 2-3 weeks to follow up and answer any nutrition related questions at this time.     [facility], [place]  [phone]

## 2023-09-25 DIAGNOSIS — I42.9 CARDIOMYOPATHY, UNSPECIFIED TYPE (HCC): ICD-10-CM

## 2023-09-25 DIAGNOSIS — K59.00 CONSTIPATION, UNSPECIFIED CONSTIPATION TYPE: ICD-10-CM

## 2023-09-25 DIAGNOSIS — I10 ESSENTIAL HYPERTENSION: ICD-10-CM

## 2023-09-25 RX ORDER — DOCUSATE SODIUM 100 MG/1
CAPSULE, LIQUID FILLED ORAL
Qty: 28 CAPSULE | Refills: 3 | Status: SHIPPED | OUTPATIENT
Start: 2023-09-25

## 2023-09-25 RX ORDER — POTASSIUM CHLORIDE 20 MEQ/1
20 TABLET, EXTENDED RELEASE ORAL DAILY
Qty: 28 TABLET | Refills: 3 | Status: SHIPPED | OUTPATIENT
Start: 2023-09-25

## 2023-09-25 RX ORDER — ASPIRIN 81 MG/1
TABLET, CHEWABLE ORAL
Qty: 28 TABLET | Refills: 3 | Status: SHIPPED | OUTPATIENT
Start: 2023-09-25

## 2023-09-26 ENCOUNTER — CARE COORDINATION (OUTPATIENT)
Dept: CARE COORDINATION | Age: 58
End: 2023-09-26

## 2023-09-26 ASSESSMENT — ENCOUNTER SYMPTOMS: DYSPNEA ASSOCIATED WITH: EXERTION

## 2023-09-26 NOTE — CARE COORDINATION
Ambulatory Care Coordination Note  2023    Patient Current Location:  Home: [address] [place] [address]  [address] [facility] [id number]     ACM contacted the patient by telephone. Verified name and  with patient as identifiers. Provided introduction to self, and explanation of the ACM role. Challenges to be reviewed by the provider   Additional needs identified to be addressed with provider: No  none               Method of communication with provider: none. ACM: Chelsea Martinez, LINDA  Gem Cruz is being followed by care coordination for education and assistance in managing his chronic conditions and healthcare needs. Pt has h/o: CHF, COPD, HLD, HTN, schizophrenia. Spoke with Gem Cruz today for f/u. Pt reports that he is doing well. Continues to work with dietician for education on diet to assist with wt loss. Pt reports that he has only lost a couple pounds so far. Encouraged him to continue working at it. Pt did receive education material that RD sent out. CHF: pt has scales. Did not weigh self today but reports that he typically does. Following with IM. Limits sodium in diet. Denies edema   COPD: breathing remains at baseline. No new cough or congestion. Pt has upcoming appt in Nov with IM. Pt does not want to switch providers. Pt is going to call customer service on his card to see if his current plan is going to be affected and if so pt tells me that the will most likely switch plans. Plan of care:  Reinforce zones  Resume daily wt monitoring   Continue working with RD. Pt to call [employer] to discuss member benefits. Will plan on graduating from care coordination with upcoming call.    Offered patient enrollment in the Remote Patient Monitoring (RPM) program for in-home monitoring:  pt graduated from program .    Lab Results       None                 Goals Addressed    None         Future Appointments   Date Time Provider [address]   2023  8:15 AM Elena Locke MD [facility] -

## 2023-10-05 ENCOUNTER — CARE COORDINATION (OUTPATIENT)
Dept: CARE COORDINATION | Age: 58
End: 2023-10-05

## 2023-10-05 NOTE — CARE COORDINATION
Contacted patient regarding Dietitian follow up. RD unable to leave VM with call back number- user busy then disconnected and no option to leave VM. RD will outreach within 1-2 weeks and will follow up as appropriate.      [address],   [phone]

## 2023-10-12 ENCOUNTER — NURSE ONLY (OUTPATIENT)
Dept: LAB | Age: 58
End: 2023-10-12

## 2023-10-12 DIAGNOSIS — I42.9 CARDIOMYOPATHY, UNSPECIFIED TYPE (HCC): ICD-10-CM

## 2023-10-12 DIAGNOSIS — R79.89 HIGH SERUM LOW DENSITY LIPOPROTEIN (LDL) CHOLESTEROL: ICD-10-CM

## 2023-10-12 DIAGNOSIS — I10 ESSENTIAL HYPERTENSION: ICD-10-CM

## 2023-10-12 LAB
ALBUMIN SERPL BCG-MCNC: 4 G/DL (ref 3.5–5.1)
ALP SERPL-CCNC: 63 U/L (ref 38–126)
ALT SERPL W/O P-5'-P-CCNC: 8 U/L (ref 11–66)
ANION GAP SERPL CALC-SCNC: 11 MEQ/L (ref 8–16)
AST SERPL-CCNC: 13 U/L (ref 5–40)
BILIRUB SERPL-MCNC: 0.4 MG/DL (ref 0.3–1.2)
BUN SERPL-MCNC: 15 MG/DL (ref 7–22)
CALCIUM SERPL-MCNC: 8.8 MG/DL (ref 8.5–10.5)
CHLORIDE SERPL-SCNC: 103 MEQ/L (ref 98–111)
CHOLEST SERPL-MCNC: 127 MG/DL (ref 100–199)
CO2 SERPL-SCNC: 27 MEQ/L (ref 23–33)
CREAT SERPL-MCNC: 1 MG/DL (ref 0.4–1.2)
GFR SERPL CREATININE-BSD FRML MDRD: > 60 ML/MIN/1.73M2
GLUCOSE SERPL-MCNC: 83 MG/DL (ref 70–108)
HDLC SERPL-MCNC: 35 MG/DL
LDLC SERPL CALC-MCNC: 70 MG/DL
POTASSIUM SERPL-SCNC: 4.5 MEQ/L (ref 3.5–5.2)
PROT SERPL-MCNC: 7.1 G/DL (ref 6.1–8)
SODIUM SERPL-SCNC: 141 MEQ/L (ref 135–145)
TRIGL SERPL-MCNC: 109 MG/DL (ref 0–199)

## 2023-10-17 ENCOUNTER — CARE COORDINATION (OUTPATIENT)
Dept: CARE COORDINATION | Age: 58
End: 2023-10-17

## 2023-10-17 NOTE — CARE COORDINATION
Contacted Steffi Prakash regarding Dietitian follow up. Pt answered, RD explained reason for call and role in care. Patient states he received the handouts in the mail. Patient declined nutrition assessment/education at this time. Patient has no nutrition related questions or concerns at this time. RD provided contact information and will follow/assist with patient return call.      [address], VERONICA  [phone]

## 2023-10-25 DIAGNOSIS — I50.32 CHRONIC DIASTOLIC CHF (CONGESTIVE HEART FAILURE) (HCC): ICD-10-CM

## 2023-10-26 RX ORDER — CARVEDILOL 25 MG/1
TABLET ORAL
Qty: 60 TABLET | Refills: 3 | Status: SHIPPED | OUTPATIENT
Start: 2023-10-26

## 2023-11-01 ENCOUNTER — CARE COORDINATION (OUTPATIENT)
Dept: CARE COORDINATION | Age: 58
End: 2023-11-01

## 2023-11-01 ASSESSMENT — ENCOUNTER SYMPTOMS: DYSPNEA ASSOCIATED WITH: EXERTION

## 2023-11-01 NOTE — CARE COORDINATION
Ambulatory Care Coordination Note  2023    Patient Current Location:  Home: [address] [place] [address]  [address] [facility] [phone]     ACM contacted the patient by telephone. Verified name and  with patient as identifiers. Provided introduction to self, and explanation of the ACM role. Challenges to be reviewed by the provider   Additional needs identified to be addressed with provider: No  none               Method of communication with provider: none. ACM: LINDA Uriartejes Colin is being followed by care coordination for education and assistance in managing his chronic conditions and healthcare needs. Pt has h/o: CHF, COPD, HLD, HTN, schizophrenia. Spoke with Jinny Colin today for f/u. Pt reports that he is doing well. Has upcoming appt with IM tomorrow  Has f/u with PCP in Dec.   CHF: continues to monitor wts daily. Denies any recent wt gain. Pt reports that current wt is 264# per his home scales. Denies SOB or edema. COPD: breathing remains at baseline. Pt aware of zone tools and importance of early symptom recognition and reporting. No new barriers identified. Pt has this ACM's number to reach out with any future care coordination needs. Will graduate from care coordination at this time.      Offered patient enrollment in the Remote Patient Monitoring (RPM) program for in-home monitoring:  pt graduate from program previously  Congestive Heart Failure Assessment    Do you understand a low sodium diet?: Yes  Do you understand how to read food labels?: Yes  How many restaurant meals do you eat per week?: 0  Do you salt your food before tasting it?: No         Symptoms:  CHF associated angina: Neg, CHF associated dyspnea on exertion: Pos, CHF associated fatigue: Neg, CHF associated leg swelling: Neg, CHF associated orthostatic hypotension: Neg, CHF associated PND: Neg, CHF associated shortness of breath: Neg, CHF associated weakness: Neg      Symptom course: stable  Patient-reported weight

## 2023-11-02 ENCOUNTER — OFFICE VISIT (OUTPATIENT)
Dept: INTERNAL MEDICINE CLINIC | Age: 58
End: 2023-11-02
Payer: MEDICARE

## 2023-11-02 VITALS
BODY MASS INDEX: 34.39 KG/M2 | SYSTOLIC BLOOD PRESSURE: 120 MMHG | RESPIRATION RATE: 20 BRPM | HEART RATE: 72 BPM | TEMPERATURE: 98.2 F | DIASTOLIC BLOOD PRESSURE: 68 MMHG | OXYGEN SATURATION: 97 % | HEIGHT: 74 IN | WEIGHT: 268 LBS

## 2023-11-02 DIAGNOSIS — Z23 NEED FOR INFLUENZA VACCINATION: ICD-10-CM

## 2023-11-02 DIAGNOSIS — I10 ESSENTIAL HYPERTENSION: Primary | ICD-10-CM

## 2023-11-02 PROCEDURE — 3074F SYST BP LT 130 MM HG: CPT | Performed by: INTERNAL MEDICINE

## 2023-11-02 PROCEDURE — 90674 CCIIV4 VAC NO PRSV 0.5 ML IM: CPT | Performed by: INTERNAL MEDICINE

## 2023-11-02 PROCEDURE — 3078F DIAST BP <80 MM HG: CPT | Performed by: INTERNAL MEDICINE

## 2023-11-02 PROCEDURE — 99213 OFFICE O/P EST LOW 20 MIN: CPT | Performed by: INTERNAL MEDICINE

## 2023-11-02 PROCEDURE — G0008 ADMIN INFLUENZA VIRUS VAC: HCPCS | Performed by: INTERNAL MEDICINE

## 2023-11-02 ASSESSMENT — ENCOUNTER SYMPTOMS
COUGH: 0
ABDOMINAL PAIN: 0
SHORTNESS OF BREATH: 0
VOMITING: 0
NAUSEA: 0
CONSTIPATION: 0
DIARRHEA: 0

## 2023-11-02 ASSESSMENT — COPD QUESTIONNAIRES: COPD: 1

## 2023-11-16 DIAGNOSIS — I10 ESSENTIAL HYPERTENSION: ICD-10-CM

## 2023-11-16 RX ORDER — HYDROCHLOROTHIAZIDE 25 MG/1
25 TABLET ORAL DAILY
Qty: 90 TABLET | Refills: 1 | Status: SHIPPED | OUTPATIENT
Start: 2023-11-16

## 2024-01-18 DIAGNOSIS — I42.9 CARDIOMYOPATHY, UNSPECIFIED TYPE (HCC): ICD-10-CM

## 2024-01-18 DIAGNOSIS — K59.00 CONSTIPATION, UNSPECIFIED CONSTIPATION TYPE: ICD-10-CM

## 2024-01-18 DIAGNOSIS — I10 ESSENTIAL HYPERTENSION: ICD-10-CM

## 2024-01-18 RX ORDER — ASPIRIN 81 MG/1
TABLET, CHEWABLE ORAL
Qty: 28 TABLET | Refills: 3 | Status: SHIPPED | OUTPATIENT
Start: 2024-01-18

## 2024-01-18 RX ORDER — POTASSIUM CHLORIDE 20 MEQ/1
20 TABLET, EXTENDED RELEASE ORAL DAILY
Qty: 28 TABLET | Refills: 3 | Status: SHIPPED | OUTPATIENT
Start: 2024-01-18

## 2024-01-18 RX ORDER — DOCUSATE SODIUM 100 MG/1
CAPSULE, LIQUID FILLED ORAL
Qty: 28 CAPSULE | Refills: 3 | Status: SHIPPED | OUTPATIENT
Start: 2024-01-18

## 2024-02-13 DIAGNOSIS — R79.89 HIGH SERUM LOW DENSITY LIPOPROTEIN (LDL) CHOLESTEROL: ICD-10-CM

## 2024-02-13 DIAGNOSIS — I50.32 CHRONIC DIASTOLIC CHF (CONGESTIVE HEART FAILURE) (HCC): ICD-10-CM

## 2024-02-13 RX ORDER — ATORVASTATIN CALCIUM 20 MG/1
20 TABLET, FILM COATED ORAL DAILY
Qty: 30 TABLET | Refills: 2 | Status: SHIPPED | OUTPATIENT
Start: 2024-02-13

## 2024-02-13 RX ORDER — CARVEDILOL 25 MG/1
TABLET ORAL
Qty: 60 TABLET | Refills: 1 | Status: SHIPPED | OUTPATIENT
Start: 2024-02-13

## 2024-04-09 DIAGNOSIS — I50.32 CHRONIC DIASTOLIC CHF (CONGESTIVE HEART FAILURE) (HCC): ICD-10-CM

## 2024-04-09 RX ORDER — CARVEDILOL 25 MG/1
TABLET ORAL
Qty: 60 TABLET | Refills: 5 | Status: SHIPPED | OUTPATIENT
Start: 2024-04-09

## 2024-05-07 DIAGNOSIS — I42.9 CARDIOMYOPATHY, UNSPECIFIED TYPE (HCC): ICD-10-CM

## 2024-05-07 DIAGNOSIS — I10 ESSENTIAL HYPERTENSION: ICD-10-CM

## 2024-05-07 DIAGNOSIS — R79.89 HIGH SERUM LOW DENSITY LIPOPROTEIN (LDL) CHOLESTEROL: ICD-10-CM

## 2024-05-07 DIAGNOSIS — K59.00 CONSTIPATION, UNSPECIFIED CONSTIPATION TYPE: ICD-10-CM

## 2024-05-07 RX ORDER — HYDROCHLOROTHIAZIDE 25 MG/1
25 TABLET ORAL DAILY
Qty: 28 TABLET | Refills: 1 | Status: SHIPPED | OUTPATIENT
Start: 2024-05-07

## 2024-05-07 RX ORDER — ATORVASTATIN CALCIUM 20 MG/1
20 TABLET, FILM COATED ORAL DAILY
Qty: 28 TABLET | Refills: 1 | Status: SHIPPED | OUTPATIENT
Start: 2024-05-07

## 2024-05-07 RX ORDER — DOCUSATE SODIUM 100 MG/1
CAPSULE, LIQUID FILLED ORAL
Qty: 28 CAPSULE | Refills: 1 | Status: SHIPPED | OUTPATIENT
Start: 2024-05-07

## 2024-05-07 RX ORDER — POTASSIUM CHLORIDE 20 MEQ/1
20 TABLET, EXTENDED RELEASE ORAL DAILY
Qty: 28 TABLET | Refills: 1 | Status: SHIPPED | OUTPATIENT
Start: 2024-05-07

## 2024-05-07 RX ORDER — ASPIRIN 81 MG/1
TABLET, CHEWABLE ORAL
Qty: 28 TABLET | Refills: 1 | Status: SHIPPED | OUTPATIENT
Start: 2024-05-07

## 2024-06-10 ENCOUNTER — NURSE ONLY (OUTPATIENT)
Dept: LAB | Age: 59
End: 2024-06-10

## 2024-06-10 DIAGNOSIS — I10 ESSENTIAL HYPERTENSION: ICD-10-CM

## 2024-06-10 LAB
ANION GAP SERPL CALC-SCNC: 5 MEQ/L (ref 8–16)
BUN SERPL-MCNC: 13 MG/DL (ref 7–22)
CALCIUM SERPL-MCNC: 8.6 MG/DL (ref 8.5–10.5)
CHLORIDE SERPL-SCNC: 104 MEQ/L (ref 98–111)
CO2 SERPL-SCNC: 32 MEQ/L (ref 23–33)
CREAT SERPL-MCNC: 1 MG/DL (ref 0.4–1.2)
GFR SERPL CREATININE-BSD FRML MDRD: 88 ML/MIN/1.73M2
GLUCOSE SERPL-MCNC: 90 MG/DL (ref 70–108)
POTASSIUM SERPL-SCNC: 4.6 MEQ/L (ref 3.5–5.2)
SODIUM SERPL-SCNC: 141 MEQ/L (ref 135–145)

## 2024-07-01 ENCOUNTER — OFFICE VISIT (OUTPATIENT)
Dept: INTERNAL MEDICINE CLINIC | Age: 59
End: 2024-07-01
Payer: MEDICARE

## 2024-07-01 VITALS
SYSTOLIC BLOOD PRESSURE: 128 MMHG | WEIGHT: 258.6 LBS | OXYGEN SATURATION: 95 % | BODY MASS INDEX: 33.2 KG/M2 | DIASTOLIC BLOOD PRESSURE: 76 MMHG | TEMPERATURE: 97.7 F | HEART RATE: 84 BPM | RESPIRATION RATE: 18 BRPM

## 2024-07-01 DIAGNOSIS — Z87.891 PERSONAL HISTORY OF TOBACCO USE: ICD-10-CM

## 2024-07-01 DIAGNOSIS — E78.5 DYSLIPIDEMIA: ICD-10-CM

## 2024-07-01 DIAGNOSIS — Z72.0 TOBACCO ABUSE: ICD-10-CM

## 2024-07-01 DIAGNOSIS — J42 CHRONIC BRONCHITIS, UNSPECIFIED CHRONIC BRONCHITIS TYPE (HCC): Chronic | ICD-10-CM

## 2024-07-01 DIAGNOSIS — I10 ESSENTIAL HYPERTENSION: Primary | ICD-10-CM

## 2024-07-01 DIAGNOSIS — E66.9 OBESITY (BMI 30-39.9): ICD-10-CM

## 2024-07-01 PROCEDURE — 99214 OFFICE O/P EST MOD 30 MIN: CPT | Performed by: INTERNAL MEDICINE

## 2024-07-01 PROCEDURE — G0296 VISIT TO DETERM LDCT ELIG: HCPCS | Performed by: INTERNAL MEDICINE

## 2024-07-01 PROCEDURE — 3078F DIAST BP <80 MM HG: CPT | Performed by: INTERNAL MEDICINE

## 2024-07-01 PROCEDURE — 3074F SYST BP LT 130 MM HG: CPT | Performed by: INTERNAL MEDICINE

## 2024-07-01 SDOH — ECONOMIC STABILITY: FOOD INSECURITY: WITHIN THE PAST 12 MONTHS, YOU WORRIED THAT YOUR FOOD WOULD RUN OUT BEFORE YOU GOT MONEY TO BUY MORE.: NEVER TRUE

## 2024-07-01 SDOH — ECONOMIC STABILITY: FOOD INSECURITY: WITHIN THE PAST 12 MONTHS, THE FOOD YOU BOUGHT JUST DIDN'T LAST AND YOU DIDN'T HAVE MONEY TO GET MORE.: NEVER TRUE

## 2024-07-01 SDOH — ECONOMIC STABILITY: INCOME INSECURITY: HOW HARD IS IT FOR YOU TO PAY FOR THE VERY BASICS LIKE FOOD, HOUSING, MEDICAL CARE, AND HEATING?: NOT VERY HARD

## 2024-07-01 ASSESSMENT — ENCOUNTER SYMPTOMS
CONSTIPATION: 0
DIARRHEA: 0
NAUSEA: 0
SHORTNESS OF BREATH: 0
VOMITING: 0
COUGH: 0
ABDOMINAL PAIN: 0

## 2024-07-01 ASSESSMENT — PATIENT HEALTH QUESTIONNAIRE - PHQ9
8. MOVING OR SPEAKING SO SLOWLY THAT OTHER PEOPLE COULD HAVE NOTICED. OR THE OPPOSITE, BEING SO FIGETY OR RESTLESS THAT YOU HAVE BEEN MOVING AROUND A LOT MORE THAN USUAL: NOT AT ALL
5. POOR APPETITE OR OVEREATING: NOT AT ALL
6. FEELING BAD ABOUT YOURSELF - OR THAT YOU ARE A FAILURE OR HAVE LET YOURSELF OR YOUR FAMILY DOWN: NOT AT ALL
SUM OF ALL RESPONSES TO PHQ QUESTIONS 1-9: 0
SUM OF ALL RESPONSES TO PHQ QUESTIONS 1-9: 0
10. IF YOU CHECKED OFF ANY PROBLEMS, HOW DIFFICULT HAVE THESE PROBLEMS MADE IT FOR YOU TO DO YOUR WORK, TAKE CARE OF THINGS AT HOME, OR GET ALONG WITH OTHER PEOPLE: NOT DIFFICULT AT ALL
SUM OF ALL RESPONSES TO PHQ QUESTIONS 1-9: 0
SUM OF ALL RESPONSES TO PHQ QUESTIONS 1-9: 0
1. LITTLE INTEREST OR PLEASURE IN DOING THINGS: NOT AT ALL
4. FEELING TIRED OR HAVING LITTLE ENERGY: NOT AT ALL
9. THOUGHTS THAT YOU WOULD BE BETTER OFF DEAD, OR OF HURTING YOURSELF: NOT AT ALL
SUM OF ALL RESPONSES TO PHQ9 QUESTIONS 1 & 2: 0
7. TROUBLE CONCENTRATING ON THINGS, SUCH AS READING THE NEWSPAPER OR WATCHING TELEVISION: NOT AT ALL
2. FEELING DOWN, DEPRESSED OR HOPELESS: NOT AT ALL
3. TROUBLE FALLING OR STAYING ASLEEP: NOT AT ALL

## 2024-07-01 ASSESSMENT — COPD QUESTIONNAIRES: COPD: 1

## 2024-07-01 NOTE — PROGRESS NOTES
[facility] PROFESSIONAL [facility] INTERNAL MEDICINE  [address]  [address]  [facility] [address]  Dept: [phone]  Dept Fax: [phone]  Loc: [phone]     Visit Date:  7/1/2024    Patient:  Rico Palomino  YOB: 1965    HPI:     Chief Complaint   Patient presents with    Hypertension       PCP - Kendall Young       Obese pt , pleasant AF male not in distress. , hx of CHF denies cardiac stents.  NO LOC or any bleeding issues. , NO PND or orthopnea.     Unfortunately still smoking about a pack per day,  claims he attempted but not successful.  I also spoke with him that I can refer him to smoking cessation   Clinic , but he prefers to think about it and call us if he decides.     HTN - BP is . Stable on HCTZ 25 mg daily, Losartan 100 mg daily, Coreg 25 mg BID    CMP - On Coreg 25 mg BID, Losartan 100 mg daily, potassium 20 meq daily. EKG today SR, one PVD noted, rate 86, wide  ms,  ms, QTc 411, . Denies SOB, KING, orthopnea, chest pain. Tolerating activity without difficulty. Has not been taking aspirin and lipitor    Last echo - 7/30/2020 - EF 55%. , which was reviewed      COPD - On Spiriva and Advair(Tudorza?), has Albuterol PRN. Denies SOB, cough, sputum production and no hemoptysis, and no significant change in weight.      Paranoid schizophrenia - On Depakote 1500 mg nightly, Invega sustenna, provided by [facility] every 3-4 months.   Old MR were reviewed.     Overall doing well, no recent hosptializations, he will get his annual flu shot today in the office.   No recent fever or chills., no SOB and no syncopal issues.     He is on [employer] disability,  he is able to move around and lives alone.          Hypertension  Pertinent negatives include no chest pain or shortness of breath.   Other  Pertinent negatives include no abdominal pain, arthralgias, chest pain, chills, coughing, fatigue, fever, myalgias, nausea, numbness, rash, vomiting or weakness.

## 2024-07-03 DIAGNOSIS — I42.9 CARDIOMYOPATHY, UNSPECIFIED TYPE (HCC): ICD-10-CM

## 2024-07-03 DIAGNOSIS — R79.89 HIGH SERUM LOW DENSITY LIPOPROTEIN (LDL) CHOLESTEROL: ICD-10-CM

## 2024-07-03 DIAGNOSIS — K59.00 CONSTIPATION, UNSPECIFIED CONSTIPATION TYPE: ICD-10-CM

## 2024-07-03 DIAGNOSIS — I10 ESSENTIAL HYPERTENSION: ICD-10-CM

## 2024-07-03 RX ORDER — POTASSIUM CHLORIDE 20 MEQ/1
20 TABLET, EXTENDED RELEASE ORAL DAILY
Qty: 90 TABLET | Refills: 1 | Status: SHIPPED | OUTPATIENT
Start: 2024-07-03

## 2024-07-03 RX ORDER — DOCUSATE SODIUM 100 MG/1
100 CAPSULE, LIQUID FILLED ORAL DAILY
Qty: 90 CAPSULE | Refills: 1 | Status: SHIPPED | OUTPATIENT
Start: 2024-07-03

## 2024-07-03 RX ORDER — ASPIRIN 81 MG/1
TABLET, CHEWABLE ORAL
Qty: 90 TABLET | Refills: 1 | Status: SHIPPED | OUTPATIENT
Start: 2024-07-03

## 2024-07-03 RX ORDER — ATORVASTATIN CALCIUM 20 MG/1
20 TABLET, FILM COATED ORAL DAILY
Qty: 90 TABLET | Refills: 1 | Status: SHIPPED | OUTPATIENT
Start: 2024-07-03

## 2024-07-19 ENCOUNTER — HOSPITAL ENCOUNTER (OUTPATIENT)
Dept: CT IMAGING | Age: 59
Discharge: HOME | End: 2024-07-19
Attending: INTERNAL MEDICINE
Payer: MEDICARE

## 2024-07-19 DIAGNOSIS — Z87.891 PERSONAL HISTORY OF TOBACCO USE: ICD-10-CM

## 2024-07-19 PROCEDURE — 71271 CT THORAX LUNG CANCER SCR C-: CPT

## 2024-08-23 ENCOUNTER — HOSPITAL ENCOUNTER (EMERGENCY)
Age: 59
Discharge: HOME | End: 2024-08-23
Payer: MEDICARE

## 2024-08-23 VITALS
HEIGHT: 74 IN | HEART RATE: 80 BPM | OXYGEN SATURATION: 97 % | DIASTOLIC BLOOD PRESSURE: 88 MMHG | BODY MASS INDEX: 33.11 KG/M2 | SYSTOLIC BLOOD PRESSURE: 137 MMHG | RESPIRATION RATE: 18 BRPM | WEIGHT: 258 LBS | TEMPERATURE: 98.1 F

## 2024-08-23 DIAGNOSIS — L02.91 ABSCESS: Primary | ICD-10-CM

## 2024-08-23 PROCEDURE — 99283 EMERGENCY DEPT VISIT LOW MDM: CPT

## 2024-08-23 PROCEDURE — 87070 CULTURE OTHR SPECIMN AEROBIC: CPT

## 2024-08-23 PROCEDURE — 87205 SMEAR GRAM STAIN: CPT

## 2024-08-23 PROCEDURE — 67700 BLEPHAROTOMY DRG ABSC EYELID: CPT

## 2024-08-23 PROCEDURE — 6370000000 HC RX 637 (ALT 250 FOR IP): Performed by: PHYSICIAN ASSISTANT

## 2024-08-23 PROCEDURE — 87075 CULTR BACTERIA EXCEPT BLOOD: CPT

## 2024-08-23 RX ORDER — CEPHALEXIN 250 MG/1
500 CAPSULE ORAL ONCE
Status: COMPLETED | OUTPATIENT
Start: 2024-08-23 | End: 2024-08-23

## 2024-08-23 RX ORDER — SULFAMETHOXAZOLE AND TRIMETHOPRIM 800; 160 MG/1; MG/1
1 TABLET ORAL 2 TIMES DAILY
Qty: 20 TABLET | Refills: 0 | Status: SHIPPED | OUTPATIENT
Start: 2024-08-23 | End: 2024-09-02

## 2024-08-23 RX ORDER — CEPHALEXIN 500 MG/1
500 CAPSULE ORAL 4 TIMES DAILY
Qty: 40 CAPSULE | Refills: 0 | Status: SHIPPED | OUTPATIENT
Start: 2024-08-23 | End: 2024-09-02

## 2024-08-23 RX ORDER — SULFAMETHOXAZOLE AND TRIMETHOPRIM 800; 160 MG/1; MG/1
1 TABLET ORAL ONCE
Status: COMPLETED | OUTPATIENT
Start: 2024-08-23 | End: 2024-08-23

## 2024-08-23 RX ADMIN — SULFAMETHOXAZOLE AND TRIMETHOPRIM 1 TABLET: 800; 160 TABLET ORAL at 21:04

## 2024-08-23 RX ADMIN — CEPHALEXIN 500 MG: 250 CAPSULE ORAL at 21:04

## 2024-08-23 ASSESSMENT — ENCOUNTER SYMPTOMS
EYE PAIN: 0
EYE DISCHARGE: 1
VOMITING: 0
NAUSEA: 0
SINUS PAIN: 0
SINUS PRESSURE: 0
PHOTOPHOBIA: 0
SHORTNESS OF BREATH: 0
EYE REDNESS: 0
EYE ITCHING: 0
RHINORRHEA: 0
FACIAL SWELLING: 1
SORE THROAT: 0

## 2024-08-23 ASSESSMENT — VISUAL ACUITY
OD: 20/30
OU: 20/30
OS: 20/40

## 2024-08-23 ASSESSMENT — PAIN DESCRIPTION - ONSET: ONSET: ON-GOING

## 2024-08-23 ASSESSMENT — PAIN - FUNCTIONAL ASSESSMENT: PAIN_FUNCTIONAL_ASSESSMENT: 0-10

## 2024-08-23 ASSESSMENT — PAIN SCALES - GENERAL: PAINLEVEL_OUTOF10: 4

## 2024-08-23 ASSESSMENT — PAIN DESCRIPTION - ORIENTATION: ORIENTATION: RIGHT

## 2024-08-23 ASSESSMENT — PAIN DESCRIPTION - FREQUENCY: FREQUENCY: INTERMITTENT

## 2024-08-23 ASSESSMENT — PAIN DESCRIPTION - LOCATION: LOCATION: EYE

## 2024-08-23 NOTE — ED TRIAGE NOTES
Pt presents to ED via lobby for evaluation of right eye being swollen. Pt states onset Saturday, 8/17, and that it got worse over time. Pt went to PCP on Monday, prescribed Augmentin and told to take for 1 week. PCP states swelling is due to sinus and allergies. Pt rates current pain 4/10, denies medications prior to arrival. Denies CP or SOB. Vitals, visual acuity obtained.

## 2024-08-24 NOTE — ED PROVIDER NOTES
[facility] EMERGENCY DEPT      Pt Name: Rico Palomino  MRN: [id number]  Birthdate 1965  Date of evaluation: 8/23/2024  Provider: Caitlin Aviles PA-C    CHIEF COMPLAINT       Chief Complaint   Patient presents with    Eye Swelling     right       Nurses Notes reviewed and I agree except as noted in the HPI.      HISTORY OF PRESENT ILLNESS    Rico Palomino is a 58 y.o. male who presents through the lobby for right eye swelling.  For about 1 week patient has had gradual onset of right eye swelling.  It is his upper lid and accompanied with eye watering.  Patient denies eye pain, visual disturbance, fever, nasal drainage, sore throat, dizziness, headache, or other complaints.  Patient saw his PCP on 8-19 and was put on Augmentin for sinusitis.  Patient reports the antibiotics are not working and his swelling has worsened prompting him to come to the ER.  Patient denies that the area is painful.    REVIEW OF SYSTEMS     Review of Systems   Constitutional:  Negative for chills and fever.   HENT:  Positive for facial swelling. Negative for congestion, postnasal drip, rhinorrhea, sinus pressure, sinus pain, sneezing and sore throat.    Eyes:  Positive for discharge. Negative for photophobia, pain, redness, itching and visual disturbance.   Respiratory:  Negative for shortness of breath.    Cardiovascular:  Negative for chest pain.   Gastrointestinal:  Negative for nausea and vomiting.   Musculoskeletal:  Negative for gait problem.   Skin:  Negative for rash.   Neurological:  Negative for dizziness, weakness and light-headedness.   Psychiatric/Behavioral:  Negative for confusion.         PAST MEDICAL HISTORY    has a past medical history of CAD (coronary artery disease), Cardiomyopathy ([facility]), Chest pain, CHF (congestive heart failure) ([facility]), COPD exacerbation ([facility]), Depression, Gout, Hyperlipidemia, Hypertension, Left ventricular dysfunction, and Schizoaffective disorder ([facility]).    SURGICAL HISTORY      has a past

## 2024-08-25 LAB
BACTERIA SPEC AEROBE CULT: NORMAL
GRAM STN SPEC: NORMAL

## 2024-08-29 LAB
BACTERIA SPEC AEROBE CULT: ABNORMAL
BACTERIA SPEC ANAEROBE CULT: ABNORMAL
GRAM STN SPEC: ABNORMAL
ORGANISM: ABNORMAL

## 2024-09-23 DIAGNOSIS — I50.32 CHRONIC DIASTOLIC CHF (CONGESTIVE HEART FAILURE) (HCC): ICD-10-CM

## 2024-09-26 RX ORDER — CARVEDILOL 25 MG/1
TABLET ORAL
Qty: 56 TABLET | Refills: 3 | Status: SHIPPED | OUTPATIENT
Start: 2024-09-26

## 2024-10-07 ENCOUNTER — TRANSCRIBE ORDERS (OUTPATIENT)
Dept: ADMINISTRATIVE | Age: 59
End: 2024-10-07

## 2024-10-07 DIAGNOSIS — D49.89 NEOPLASM OF UNSPECIFIED BEHAVIOR OF OTHER SPECIFIED SITES: Primary | ICD-10-CM

## 2024-10-18 ENCOUNTER — HOSPITAL ENCOUNTER (OUTPATIENT)
Dept: CT IMAGING | Age: 59
Discharge: HOME | End: 2024-10-18
Attending: OPHTHALMOLOGY
Payer: MEDICARE

## 2024-10-18 DIAGNOSIS — D49.89 NEOPLASM OF UNSPECIFIED BEHAVIOR OF OTHER SPECIFIED SITES: ICD-10-CM

## 2024-10-18 PROCEDURE — 70480 CT ORBIT/EAR/FOSSA W/O DYE: CPT

## 2024-12-06 ENCOUNTER — LAB (OUTPATIENT)
Dept: LAB | Age: 59
End: 2024-12-06

## 2024-12-06 DIAGNOSIS — E78.5 DYSLIPIDEMIA: ICD-10-CM

## 2024-12-06 LAB
ALBUMIN SERPL BCG-MCNC: 3.4 G/DL (ref 3.5–5.1)
ALP SERPL-CCNC: 66 U/L (ref 38–126)
ALT SERPL W/O P-5'-P-CCNC: 7 U/L (ref 11–66)
AST SERPL-CCNC: 15 U/L (ref 5–40)
BILIRUB CONJ SERPL-MCNC: < 0.1 MG/DL (ref 0.1–13.8)
BILIRUB SERPL-MCNC: 0.2 MG/DL (ref 0.3–1.2)
CHOLEST SERPL-MCNC: 102 MG/DL (ref 100–199)
HDLC SERPL-MCNC: 44 MG/DL
LDLC SERPL CALC-MCNC: 49 MG/DL
PROT SERPL-MCNC: 6.2 G/DL (ref 6.1–8)
TRIGL SERPL-MCNC: 45 MG/DL (ref 0–199)

## 2024-12-13 DIAGNOSIS — I42.9 CARDIOMYOPATHY, UNSPECIFIED TYPE (HCC): ICD-10-CM

## 2024-12-13 DIAGNOSIS — I10 ESSENTIAL HYPERTENSION: ICD-10-CM

## 2024-12-13 DIAGNOSIS — K59.00 CONSTIPATION, UNSPECIFIED CONSTIPATION TYPE: ICD-10-CM

## 2024-12-13 DIAGNOSIS — R79.89 HIGH SERUM LOW DENSITY LIPOPROTEIN (LDL) CHOLESTEROL: ICD-10-CM

## 2024-12-17 RX ORDER — ASPIRIN 81 MG/1
TABLET, CHEWABLE ORAL
Qty: 28 TABLET | Refills: 2 | Status: SHIPPED | OUTPATIENT
Start: 2024-12-17

## 2024-12-17 RX ORDER — DOCUSATE SODIUM 100 MG/1
100 CAPSULE, LIQUID FILLED ORAL DAILY
Qty: 28 CAPSULE | Refills: 3 | Status: SHIPPED | OUTPATIENT
Start: 2024-12-17

## 2024-12-17 RX ORDER — POTASSIUM CHLORIDE 1500 MG/1
20 TABLET, EXTENDED RELEASE ORAL DAILY
Qty: 28 TABLET | Refills: 1 | Status: SHIPPED | OUTPATIENT
Start: 2024-12-17

## 2024-12-17 RX ORDER — ATORVASTATIN CALCIUM 20 MG/1
20 TABLET, FILM COATED ORAL DAILY
Qty: 28 TABLET | Refills: 4 | Status: SHIPPED | OUTPATIENT
Start: 2024-12-17

## 2025-01-03 ENCOUNTER — OFFICE VISIT (OUTPATIENT)
Dept: INTERNAL MEDICINE CLINIC | Age: 60
End: 2025-01-03
Payer: MEDICARE

## 2025-01-03 VITALS
TEMPERATURE: 98 F | OXYGEN SATURATION: 97 % | SYSTOLIC BLOOD PRESSURE: 136 MMHG | HEART RATE: 74 BPM | DIASTOLIC BLOOD PRESSURE: 76 MMHG | RESPIRATION RATE: 18 BRPM | BODY MASS INDEX: 33.05 KG/M2 | WEIGHT: 257.4 LBS

## 2025-01-03 DIAGNOSIS — I10 ESSENTIAL HYPERTENSION: Primary | ICD-10-CM

## 2025-01-03 DIAGNOSIS — E78.5 DYSLIPIDEMIA: ICD-10-CM

## 2025-01-03 PROCEDURE — 3078F DIAST BP <80 MM HG: CPT | Performed by: INTERNAL MEDICINE

## 2025-01-03 PROCEDURE — 3075F SYST BP GE 130 - 139MM HG: CPT | Performed by: INTERNAL MEDICINE

## 2025-01-03 PROCEDURE — 99214 OFFICE O/P EST MOD 30 MIN: CPT | Performed by: INTERNAL MEDICINE

## 2025-01-03 RX ORDER — CHOLECALCIFEROL (VITAMIN D3) 25 MCG
2000 TABLET ORAL DAILY
COMMUNITY
Start: 2024-12-30

## 2025-01-03 ASSESSMENT — PATIENT HEALTH QUESTIONNAIRE - PHQ9
10. IF YOU CHECKED OFF ANY PROBLEMS, HOW DIFFICULT HAVE THESE PROBLEMS MADE IT FOR YOU TO DO YOUR WORK, TAKE CARE OF THINGS AT HOME, OR GET ALONG WITH OTHER PEOPLE: NOT DIFFICULT AT ALL
SUM OF ALL RESPONSES TO PHQ9 QUESTIONS 1 & 2: 0
9. THOUGHTS THAT YOU WOULD BE BETTER OFF DEAD, OR OF HURTING YOURSELF: NOT AT ALL
6. FEELING BAD ABOUT YOURSELF - OR THAT YOU ARE A FAILURE OR HAVE LET YOURSELF OR YOUR FAMILY DOWN: NOT AT ALL
2. FEELING DOWN, DEPRESSED OR HOPELESS: NOT AT ALL
5. POOR APPETITE OR OVEREATING: NOT AT ALL
1. LITTLE INTEREST OR PLEASURE IN DOING THINGS: NOT AT ALL
7. TROUBLE CONCENTRATING ON THINGS, SUCH AS READING THE NEWSPAPER OR WATCHING TELEVISION: NOT AT ALL
SUM OF ALL RESPONSES TO PHQ QUESTIONS 1-9: 0
4. FEELING TIRED OR HAVING LITTLE ENERGY: NOT AT ALL
SUM OF ALL RESPONSES TO PHQ QUESTIONS 1-9: 0
8. MOVING OR SPEAKING SO SLOWLY THAT OTHER PEOPLE COULD HAVE NOTICED. OR THE OPPOSITE, BEING SO FIGETY OR RESTLESS THAT YOU HAVE BEEN MOVING AROUND A LOT MORE THAN USUAL: NOT AT ALL
3. TROUBLE FALLING OR STAYING ASLEEP: NOT AT ALL

## 2025-01-03 ASSESSMENT — ENCOUNTER SYMPTOMS
CONSTIPATION: 0
SHORTNESS OF BREATH: 0
VOMITING: 0
COUGH: 0
NAUSEA: 0
ABDOMINAL PAIN: 0
DIARRHEA: 0

## 2025-01-03 ASSESSMENT — COPD QUESTIONNAIRES: COPD: 1

## 2025-01-03 NOTE — PROGRESS NOTES
breath. Pertinent negatives include no chest pain, fever or myalgias.   Hyperlipidemia  Pertinent negatives include no chest pain, myalgias or shortness of breath.   Nicotine Dependence  Symptoms are negative for fatigue.       Chronic diastolic CHF - systolic LV dysfunction is resolved on   meds      Medications    Current Outpatient Medications:     vitamin D3 (CHOLECALCIFEROL) 25 MCG (1000 UT) TABS tablet, Take 2 tablets by mouth daily, Disp: , Rfl:     potassium chloride (KLOR-CON M) 20 MEQ extended release tablet, TAKE 1 TABLET BY MOUTH DAILY, Disp: 28 tablet, Rfl: 1    aspirin (ASPIRIN LOW DOSE) 81 MG chewable tablet, CHEW 1 TABLET BY MOUTH DAILY, Disp: 28 tablet, Rfl: 2    atorvastatin (LIPITOR) 20 MG tablet, TAKE 1 TABLET BY MOUTH DAILY, Disp: 28 tablet, Rfl: 4    docusate sodium (COLACE) 100 MG capsule, TAKE 1 CAPSULE BY MOUTH DAILY, Disp: 28 capsule, Rfl: 3    carvedilol (COREG) 25 MG tablet, TAKE 1 TABLET BY MOUTH TWICE A DAY, Disp: 56 tablet, Rfl: 3    hydroCHLOROthiazide (HYDRODIURIL) 25 MG tablet, TAKE 1 TABLET BY MOUTH DAILY, Disp: 28 tablet, Rfl: 1    Misc. Devices (DIGITAL GLASS SCALE) MISC, Use for daily wt monitoring, Disp: 1 each, Rfl: 0    albuterol sulfate HFA (PROVENTIL HFA) 108 (90 Base) MCG/ACT inhaler, Inhale 2 puffs into the lungs every 6 hours as needed for Wheezing or Shortness of Breath, Disp: 1 each, Rfl: 2    tiotropium (SPIRIVA HANDIHALER) 18 MCG inhalation capsule, Inhale 1 capsule into the lungs daily, Disp: 90 capsule, Rfl: 2    tamsulosin (FLOMAX) 0.4 MG capsule, Take 1 capsule by mouth daily, Disp: , Rfl:     losartan (COZAAR) 100 MG tablet, Take 1 tablet by mouth daily, Disp: , Rfl:     allopurinol (ZYLOPRIM) 300 MG tablet, Take 1 tablet by mouth daily, Disp: , Rfl:     divalproex (DEPAKOTE ER) 500 MG ER tablet, Take 3 tablets by mouth nightly, Disp: , Rfl:     paliperidone palmitate (INVEGA SUSTENNA) 156 MG/ML SUSP IM injection, Inject 156 mg into the muscle every 30 days,

## 2025-01-14 DIAGNOSIS — I50.32 CHRONIC DIASTOLIC CHF (CONGESTIVE HEART FAILURE) (HCC): ICD-10-CM

## 2025-01-16 ENCOUNTER — LAB (OUTPATIENT)
Dept: LAB | Age: 60
End: 2025-01-16

## 2025-01-16 DIAGNOSIS — I10 ESSENTIAL HYPERTENSION: ICD-10-CM

## 2025-01-16 DIAGNOSIS — E78.5 DYSLIPIDEMIA: ICD-10-CM

## 2025-01-16 LAB
ALBUMIN SERPL BCG-MCNC: 3.7 G/DL (ref 3.5–5.1)
ALP SERPL-CCNC: 53 U/L (ref 38–126)
ALT SERPL W/O P-5'-P-CCNC: < 5 U/L (ref 11–66)
ANION GAP SERPL CALC-SCNC: 10 MEQ/L (ref 8–16)
AST SERPL-CCNC: 13 U/L (ref 5–40)
BILIRUB SERPL-MCNC: 0.5 MG/DL (ref 0.3–1.2)
BUN SERPL-MCNC: 11 MG/DL (ref 7–22)
CALCIUM SERPL-MCNC: 8.9 MG/DL (ref 8.5–10.5)
CHLORIDE SERPL-SCNC: 104 MEQ/L (ref 98–111)
CHOLEST SERPL-MCNC: 118 MG/DL (ref 100–199)
CO2 SERPL-SCNC: 28 MEQ/L (ref 23–33)
CREAT SERPL-MCNC: 1 MG/DL (ref 0.4–1.2)
GFR SERPL CREATININE-BSD FRML MDRD: 88 ML/MIN/1.73M2
GLUCOSE SERPL-MCNC: 86 MG/DL (ref 70–108)
HDLC SERPL-MCNC: 36 MG/DL
LDLC SERPL CALC-MCNC: 68 MG/DL
POTASSIUM SERPL-SCNC: 4.5 MEQ/L (ref 3.5–5.2)
PROT SERPL-MCNC: 6.4 G/DL (ref 6.1–8)
SODIUM SERPL-SCNC: 142 MEQ/L (ref 135–145)
TRIGL SERPL-MCNC: 69 MG/DL (ref 0–199)

## 2025-01-16 RX ORDER — CARVEDILOL 25 MG/1
TABLET ORAL
Qty: 56 TABLET | Refills: 3 | Status: SHIPPED | OUTPATIENT
Start: 2025-01-16

## 2025-01-21 LAB
BASOPHILS ABSOLUTE: ABNORMAL
BASOPHILS RELATIVE PERCENT: ABNORMAL
BUN BLDV-MCNC: 13 MG/DL
CALCIUM SERPL-MCNC: 8.8 MG/DL
CHLORIDE BLD-SCNC: 104 MMOL/L
CO2: 26 MMOL/L
CREAT SERPL-MCNC: 0.82 MG/DL
EGFR: 102
EOSINOPHILS ABSOLUTE: ABNORMAL
EOSINOPHILS RELATIVE PERCENT: ABNORMAL
GLUCOSE BLD-MCNC: 84 MG/DL
HCT VFR BLD CALC: 40.4 % (ref 41–53)
HEMOGLOBIN: 13.2 G/DL (ref 13.5–17.5)
INR, EXTERNAL: 1.1
LYMPHOCYTES ABSOLUTE: ABNORMAL
LYMPHOCYTES RELATIVE PERCENT: ABNORMAL
MCH RBC QN AUTO: 27.5 PG
MCHC RBC AUTO-ENTMCNC: 32.7 G/DL
MCV RBC AUTO: 84.2 FL
MONOCYTES ABSOLUTE: ABNORMAL
MONOCYTES RELATIVE PERCENT: ABNORMAL
NEUTROPHILS ABSOLUTE: ABNORMAL
NEUTROPHILS RELATIVE PERCENT: ABNORMAL
PLATELET # BLD: 145 K/ΜL
PMV BLD AUTO: 11.5 FL
POTASSIUM SERPL-SCNC: 3.8 MMOL/L
PT, EXTERNAL: 14.3
RBC # BLD: ABNORMAL 10*6/UL
SODIUM BLD-SCNC: 141 MMOL/L
WBC # BLD: 5.99 10^3/ML

## 2025-01-23 ENCOUNTER — TELEPHONE (OUTPATIENT)
Dept: INTERNAL MEDICINE CLINIC | Age: 60
End: 2025-01-23

## 2025-01-29 ENCOUNTER — OFFICE VISIT (OUTPATIENT)
Dept: INTERNAL MEDICINE CLINIC | Age: 60
End: 2025-01-29

## 2025-01-29 VITALS
OXYGEN SATURATION: 98 % | BODY MASS INDEX: 33.59 KG/M2 | SYSTOLIC BLOOD PRESSURE: 130 MMHG | DIASTOLIC BLOOD PRESSURE: 78 MMHG | WEIGHT: 261.6 LBS | TEMPERATURE: 98.1 F | HEART RATE: 72 BPM | RESPIRATION RATE: 18 BRPM

## 2025-01-29 DIAGNOSIS — Z01.818 PREOP EXAMINATION: Primary | ICD-10-CM

## 2025-01-29 DIAGNOSIS — D69.6 THROMBOCYTOPENIA (HCC): ICD-10-CM

## 2025-01-29 DIAGNOSIS — R79.1 ABNORMAL PARTIAL THROMBOPLASTIN TIME (PTT): ICD-10-CM

## 2025-01-29 DIAGNOSIS — D64.9 ANEMIA, UNSPECIFIED TYPE: ICD-10-CM

## 2025-01-29 DIAGNOSIS — J34.9 SINUS DISEASE: ICD-10-CM

## 2025-01-29 SDOH — ECONOMIC STABILITY: FOOD INSECURITY: WITHIN THE PAST 12 MONTHS, YOU WORRIED THAT YOUR FOOD WOULD RUN OUT BEFORE YOU GOT MONEY TO BUY MORE.: NEVER TRUE

## 2025-01-29 SDOH — ECONOMIC STABILITY: FOOD INSECURITY: WITHIN THE PAST 12 MONTHS, THE FOOD YOU BOUGHT JUST DIDN'T LAST AND YOU DIDN'T HAVE MONEY TO GET MORE.: NEVER TRUE

## 2025-01-29 ASSESSMENT — ENCOUNTER SYMPTOMS
WHEEZING: 0
COUGH: 0
CHOKING: 0
ABDOMINAL DISTENTION: 0
EYE PAIN: 0
CONSTIPATION: 0
SORE THROAT: 0
SHORTNESS OF BREATH: 0
DIARRHEA: 0
CHEST TIGHTNESS: 0
COLOR CHANGE: 0

## 2025-01-29 NOTE — PROGRESS NOTES
[facility]  [facility] Clinic Note  [address]  [place] [address]  Dept: [phone]  Dept Fax: [phone]    Chief complaint(s):  Pre-op Exam (Left anterior orbiotomy with exploration- 2/11 Nidhi Bacon )    ASSESSMENT AND PLAN     1. Preop examination    2. Thrombocytopenia (HCC)    3. Anemia, unspecified type    4. Abnormal partial thromboplastin time (PTT)    5. Sinus disease      Assessment/Plan:   Pre-operative Cardiac Risk Assessment: Active  Reactions to anesthesia: none  History of excessive bleeding: none  History of blood clots: none  History of blood transfusions: none  Reactions to blood transfusion: none  Mallampati score: 3   I have reviewed recent diagnostic testing including labs, EKG.  Please see EKG for interpretation.  The patient has a Class 1 risk of adverse outcomes with non-cardiac surgery and a 10.1% estimated rate of myocardial infarction, pulmonary edema, ventricular fibrillation, cardiac arrest, or complete heart block per [facility] Revised Cardiac Risk.  The patient is in agreement with and verbalizes understanding of the plan of care today and has no additional questions or complaints.   Hold ASA for 7 days prior to procedure  Recommend smoking cessation for better healing prior to surgery.       1. Preop examination  2. Thrombocytopenia (HCC)  3. Anemia, unspecified type  4. Abnormal partial thromboplastin time (PTT)  5. Sinus disease      No orders of the defined types were placed in this encounter.    New Prescriptions    No medications on file     Health Maintenance   Topic Date Due    Pneumococcal 0-64 years Vaccine (1 of 2 - PCV) Never done    HIV screen  Never done    Hepatitis C screen  Never done    Hepatitis B vaccine (1 of 3 - 19+ 3-dose series) Never done    Shingles vaccine (1 of 2) Never done    Flu vaccine (1) 08/01/2024    COVID-19 Vaccine (6 - 2023-24 season) 09/01/2024    Annual Wellness Visit (Medicare Advantage)

## 2025-02-10 DIAGNOSIS — I10 ESSENTIAL HYPERTENSION: ICD-10-CM

## 2025-02-11 RX ORDER — POTASSIUM CHLORIDE 1500 MG/1
20 TABLET, EXTENDED RELEASE ORAL DAILY
Qty: 90 TABLET | Refills: 1 | Status: SHIPPED | OUTPATIENT
Start: 2025-02-11

## 2025-03-11 DIAGNOSIS — I42.9 CARDIOMYOPATHY, UNSPECIFIED TYPE (HCC): ICD-10-CM

## 2025-03-12 RX ORDER — ASPIRIN 81 MG/1
TABLET, CHEWABLE ORAL
Qty: 28 TABLET | Refills: 2 | Status: SHIPPED | OUTPATIENT
Start: 2025-03-12

## 2025-03-12 NOTE — TELEPHONE ENCOUNTER
Patient of Dr. Choudhary     Last ordered 12/17/2024, disp 28, refill 2    Last visit 1/3:    1. Cardiomyopathy resolved, hx of LV dysfunction resolved.  Previous echo reviewed with pt , EF is normal at 55 %    No signs of decompensation.   No cardiac s/s.   On beta blocker, ARB.   Weight stable overall,  no SOB, KING, orthopnea, chest pain.   - aspirin 81 MG chewable tablet; Take 1 tablet by mouth daily  Dispense: 30 tablet; Refill: 5     Next visit 7/10

## 2025-04-08 DIAGNOSIS — K59.00 CONSTIPATION, UNSPECIFIED CONSTIPATION TYPE: ICD-10-CM

## 2025-04-08 RX ORDER — DOCUSATE SODIUM 100 MG/1
100 CAPSULE, LIQUID FILLED ORAL DAILY
Qty: 28 CAPSULE | Refills: 3 | OUTPATIENT
Start: 2025-04-08

## 2025-05-06 DIAGNOSIS — I50.32 CHRONIC DIASTOLIC CHF (CONGESTIVE HEART FAILURE) (HCC): ICD-10-CM

## 2025-05-06 DIAGNOSIS — R79.89 HIGH SERUM LOW DENSITY LIPOPROTEIN (LDL) CHOLESTEROL: ICD-10-CM

## 2025-05-07 RX ORDER — ATORVASTATIN CALCIUM 20 MG/1
20 TABLET, FILM COATED ORAL DAILY
Qty: 28 TABLET | Refills: 4 | Status: SHIPPED | OUTPATIENT
Start: 2025-05-07

## 2025-05-07 RX ORDER — CARVEDILOL 25 MG/1
25 TABLET ORAL 2 TIMES DAILY
Qty: 56 TABLET | Refills: 3 | Status: SHIPPED | OUTPATIENT
Start: 2025-05-07

## 2025-05-07 NOTE — TELEPHONE ENCOUNTER
Last visit 1/3:     2. Essential hypertension  BP stable on current therapy.   Continue.   - Magnesium; Future  - LDL Cholesterol, Direct; Future  - Comprehensive Metabolic Panel; Future  - CBC With Auto Differential; Future        Lab Results   Component Value Date/Time      06/10/2024 07:37 AM     K 4.6 06/10/2024 07:37 AM      06/10/2024 07:37 AM     CO2 32 06/10/2024 07:37 AM     BUN 13 06/10/2024 07:37 AM     CREATININE 1.0 06/10/2024 07:37 AM     GLUCOSE 90 06/10/2024 07:37 AM     GLUCOSE 91 03/26/2012 05:29 AM     CALCIUM 8.6 06/10/2024 07:37 AM     LABGLOM 88 06/10/2024 07:37 AM     LABGLOM >60 10/12/2023 07:41 AM     3. High serum low density lipoprotein (LDL) cholesterol  On  Lipitor 20 mg daily , admitted to compliance  Recheck LDL with next labs.  - Magnesium; Future  - LDL Cholesterol, Direct; Future  - Comprehensive Metabolic Panel; Future  - CBC With Auto Differential; Future  - atorvastatin (LIPITOR) 20 MG tablet; Take 1 tablet by mouth daily  Dispense: 30 tablet; Refill: 5     Next visit 7/10

## 2025-05-09 ENCOUNTER — HOSPITAL ENCOUNTER (EMERGENCY)
Age: 60
Discharge: HOME | End: 2025-05-09
Payer: MEDICARE

## 2025-05-09 VITALS
RESPIRATION RATE: 18 BRPM | HEART RATE: 82 BPM | SYSTOLIC BLOOD PRESSURE: 174 MMHG | HEIGHT: 74 IN | DIASTOLIC BLOOD PRESSURE: 89 MMHG | BODY MASS INDEX: 31.83 KG/M2 | TEMPERATURE: 97.5 F | WEIGHT: 248 LBS | OXYGEN SATURATION: 97 %

## 2025-05-09 DIAGNOSIS — M54.50 ACUTE EXACERBATION OF CHRONIC LOW BACK PAIN: Primary | ICD-10-CM

## 2025-05-09 DIAGNOSIS — G89.29 ACUTE EXACERBATION OF CHRONIC LOW BACK PAIN: Primary | ICD-10-CM

## 2025-05-09 PROCEDURE — 99284 EMERGENCY DEPT VISIT MOD MDM: CPT

## 2025-05-09 PROCEDURE — 6360000002 HC RX W HCPCS: Performed by: NURSE PRACTITIONER

## 2025-05-09 PROCEDURE — 6370000000 HC RX 637 (ALT 250 FOR IP): Performed by: NURSE PRACTITIONER

## 2025-05-09 PROCEDURE — 96372 THER/PROPH/DIAG INJ SC/IM: CPT

## 2025-05-09 RX ORDER — IBUPROFEN 600 MG/1
600 TABLET, FILM COATED ORAL 3 TIMES DAILY PRN
Qty: 30 TABLET | Refills: 0 | Status: SHIPPED | OUTPATIENT
Start: 2025-05-09

## 2025-05-09 RX ORDER — ORPHENADRINE CITRATE 30 MG/ML
60 INJECTION INTRAMUSCULAR; INTRAVENOUS ONCE
Status: COMPLETED | OUTPATIENT
Start: 2025-05-09 | End: 2025-05-09

## 2025-05-09 RX ORDER — KETOROLAC TROMETHAMINE 30 MG/ML
30 INJECTION, SOLUTION INTRAMUSCULAR; INTRAVENOUS ONCE
Status: COMPLETED | OUTPATIENT
Start: 2025-05-09 | End: 2025-05-09

## 2025-05-09 RX ORDER — CYCLOBENZAPRINE HCL 5 MG
5 TABLET ORAL 3 TIMES DAILY PRN
Qty: 15 TABLET | Refills: 0 | Status: SHIPPED | OUTPATIENT
Start: 2025-05-09 | End: 2025-05-14

## 2025-05-09 RX ORDER — LIDOCAINE 4 G/G
1 PATCH TOPICAL ONCE
Status: DISCONTINUED | OUTPATIENT
Start: 2025-05-09 | End: 2025-05-09 | Stop reason: HOSPADM

## 2025-05-09 RX ORDER — PREDNISONE 50 MG/1
50 TABLET ORAL DAILY
Qty: 5 TABLET | Refills: 0 | Status: SHIPPED | OUTPATIENT
Start: 2025-05-09 | End: 2025-05-14

## 2025-05-09 RX ADMIN — ORPHENADRINE CITRATE 60 MG: 60 INJECTION INTRAMUSCULAR; INTRAVENOUS at 02:29

## 2025-05-09 RX ADMIN — KETOROLAC TROMETHAMINE 30 MG: 30 INJECTION, SOLUTION INTRAMUSCULAR at 02:33

## 2025-05-09 ASSESSMENT — PAIN SCALES - GENERAL
PAINLEVEL_OUTOF10: 9

## 2025-05-09 ASSESSMENT — PAIN - FUNCTIONAL ASSESSMENT: PAIN_FUNCTIONAL_ASSESSMENT: 0-10

## 2025-05-09 ASSESSMENT — PAIN DESCRIPTION - DESCRIPTORS: DESCRIPTORS: SHOOTING

## 2025-05-09 ASSESSMENT — PAIN DESCRIPTION - LOCATION: LOCATION: BACK

## 2025-05-09 NOTE — DISCHARGE INSTRUCTIONS
Call today or tomorrow to follow up with Prakash Mccoy MD  in 2 days.    Use an ice pack or bag filled with ice and apply to the injured area 3 - 4 times a day for 15 - 20 minutes each time.  If the injury is older than 3 days, then use a heating pad to help relax the muscles in your back.    Use ibuprofen or Tylenol (unless prescribed medications that have Tylenol in it) for pain.  You can take over the counter Ibuprofen (advil) tablets (4 tablets every 8 hours or 3 tablets every 6 hours or 2 tablets every 4 hours)    Ethan' Flexion Exercises     1. Pelvic tilt. Lie on your back with knees bent, feet flat on floor. Flatten the small of your back against the floor, without pushing down with the legs. Hold for 5 to 10 seconds.     2. Single Knee to chest. Lie on your back with knees bent and feet flat on the floor. Slowly pull your right knee toward your shoulder and hold 5 to 10 seconds. Lower the knee and repeat with the other knee.     3. Double knee to chest. Begin as in the previous exercise. After pulling right knee to chest, pull left knee to chest and hold both knees for 5 to 10 seconds. Slowly lower one leg at a time.     4. Partial sit-up. Do the pelvic tilt (exercise 1) and, while holding this position, slowly curl your head and shoulders off the floor. Hold briefly. Return slowly to the starting position.     5. Hamstring stretch. Start in long sitting with toes directed toward the ceiling and knees fully extended. Slowly lower the trunk forward over the legs, keeping knees extended, arms outstretched over the legs, and eyes focus ahead.     6. Hip Flexor stretch. Place one foot in front of the other with the left (front) knee flexed and the right (back) knee held rigidly straight. Flex forward through the trunk until the left knee contacts the axillary fold (arm pit region). Repeat with right leg forward and left leg back.     7. Squat. Stand with both feet parallel, about shoulder's width

## 2025-05-09 NOTE — ED PROVIDER NOTES
[facility] EMERGENCY DEPARTMENT      EMERGENCY MEDICINE     Pt Name: Rico Palomino  MRN: [id number]  Birthdate 1965  Date of evaluation: 5/9/2025  Provider: JASKARAN Kern CNP    CHIEF COMPLAINT       Chief Complaint   Patient presents with    Back Pain     HISTORY OF PRESENT ILLNESS   Rico Palomino is a pleasant 59 y.o. male who presents to the emergency department from home with c/o low back pain.  This is chronic pain.  Pain is the same as his normal pain but is flared up.  No loss of b/b.  No saddle anesthesia.  No fever.        History is obtained from:  patient  PASTMEDICAL HISTORY     Past Medical History:   Diagnosis Date    CAD (coronary artery disease)     Cardiomyopathy ([facility])     Chest pain     CHF (congestive heart failure) ([facility])     COPD exacerbation ([facility]) 8/17/2015    Depression     Gout     Hyperlipidemia     Hypertension     Left ventricular dysfunction     Schizoaffective disorder ([facility])        Patient Active Problem List   Diagnosis Code    Depression F32.A    Chest pain R07.9    Cardiomyopathy ([facility]) I42.9    Schizoaffective disorder ([facility]) F25.9    Chronic bronchitis ([facility]) J42    Obstructive sleep apnea G47.33    Obesity (BMI 30-39.9) E66.9    Sleep difficulties G47.9    Snoring R06.83    Bruxism F45.8    Sleep talking G47.8    Restless sleeper G47.9    Insomnia G47.00    Sleep disturbance G47.9    Claustrophobia F40.240    Irregular heart beat I49.9    Heart disease I51.9    COPD exacerbation ([facility]) J44.1    Acute respiratory failure with hypoxia and hypercapnia ([facility]) J96.01, J96.02    Tobacco abuse Z72.0    Influenza with respiratory manifestation other than pneumonia J11.1    Respiratory acidosis E87.29    NETTA (obstructive sleep apnea) G47.33    Hypersomnia G47.10    Coronary artery disease involving native coronary artery of native heart without angina pectoris I25.10    Essential hypertension I10     SURGICAL HISTORY       Past Surgical History:   Procedure Laterality Date

## 2025-05-09 NOTE — ED NOTES
Pt presents to ED via self with c/c of back pain/\"back is out\" x3 days, which he rates a 9/10 at this time. Pt did not taking anything for pain PTA. Pt will not sit in ED chair because of the pain. Respirations easy, unlabored. Call light within reach. Denies further needs.

## 2025-06-03 DIAGNOSIS — I42.9 CARDIOMYOPATHY, UNSPECIFIED TYPE (HCC): ICD-10-CM

## 2025-06-05 RX ORDER — ASPIRIN 81 MG/1
TABLET, CHEWABLE ORAL
Qty: 30 TABLET | Refills: 5 | Status: SHIPPED | OUTPATIENT
Start: 2025-06-05

## 2025-08-05 DIAGNOSIS — I50.32 CHRONIC DIASTOLIC CHF (CONGESTIVE HEART FAILURE) (HCC): ICD-10-CM

## 2025-08-05 DIAGNOSIS — R79.89 HIGH SERUM LOW DENSITY LIPOPROTEIN (LDL) CHOLESTEROL: ICD-10-CM

## 2025-08-05 RX ORDER — ATORVASTATIN CALCIUM 20 MG/1
20 TABLET, FILM COATED ORAL DAILY
Qty: 90 TABLET | Refills: 1 | Status: SHIPPED | OUTPATIENT
Start: 2025-08-05

## 2025-08-05 RX ORDER — CARVEDILOL 25 MG/1
25 TABLET ORAL 2 TIMES DAILY
Qty: 90 TABLET | Refills: 1 | Status: SHIPPED | OUTPATIENT
Start: 2025-08-05

## 2025-08-07 ENCOUNTER — OFFICE VISIT (OUTPATIENT)
Dept: INTERNAL MEDICINE CLINIC | Age: 60
End: 2025-08-07
Payer: MEDICARE

## 2025-08-07 VITALS
DIASTOLIC BLOOD PRESSURE: 80 MMHG | TEMPERATURE: 98 F | WEIGHT: 257.4 LBS | SYSTOLIC BLOOD PRESSURE: 110 MMHG | RESPIRATION RATE: 18 BRPM | BODY MASS INDEX: 33.05 KG/M2 | HEART RATE: 80 BPM | OXYGEN SATURATION: 96 %

## 2025-08-07 DIAGNOSIS — D64.9 ANEMIA, UNSPECIFIED TYPE: ICD-10-CM

## 2025-08-07 DIAGNOSIS — E66.9 OBESITY (BMI 30-39.9): ICD-10-CM

## 2025-08-07 DIAGNOSIS — I10 ESSENTIAL HYPERTENSION: Primary | ICD-10-CM

## 2025-08-07 PROCEDURE — 3079F DIAST BP 80-89 MM HG: CPT | Performed by: INTERNAL MEDICINE

## 2025-08-07 PROCEDURE — 99214 OFFICE O/P EST MOD 30 MIN: CPT | Performed by: INTERNAL MEDICINE

## 2025-08-07 PROCEDURE — 3074F SYST BP LT 130 MM HG: CPT | Performed by: INTERNAL MEDICINE

## 2025-08-07 RX ORDER — MELOXICAM 15 MG/1
15 TABLET ORAL DAILY
COMMUNITY
Start: 2024-12-30

## 2025-08-07 ASSESSMENT — COPD QUESTIONNAIRES: COPD: 1

## 2025-08-07 ASSESSMENT — ENCOUNTER SYMPTOMS
ABDOMINAL PAIN: 0
CONSTIPATION: 0
NAUSEA: 0
DIARRHEA: 0
SHORTNESS OF BREATH: 0
VOMITING: 0
COUGH: 0

## (undated) DEVICE — SET LNR RED GRN W/ BASE CLEANASCOPE

## (undated) DEVICE — SET ADMIN 25ML L117IN PMP MOD CK VLV RLER CLMP 2 SMRTSITE

## (undated) DEVICE — SOLUTION IV 1000ML 0.45% SOD CHL PH 5 INJ USP VIAFLX PLAS

## (undated) DEVICE — KIT INF CTRL 2OZ LUB TBNG L12FT DBL END BRSH SYR OP4

## (undated) DEVICE — TUBING IV STOPCOCK 48 CM 3 W